# Patient Record
Sex: MALE | Race: AMERICAN INDIAN OR ALASKA NATIVE | NOT HISPANIC OR LATINO | URBAN - METROPOLITAN AREA
[De-identification: names, ages, dates, MRNs, and addresses within clinical notes are randomized per-mention and may not be internally consistent; named-entity substitution may affect disease eponyms.]

---

## 2018-08-14 ENCOUNTER — HOSPITAL ENCOUNTER (OUTPATIENT)
Dept: RADIOLOGY | Facility: MEDICAL CENTER | Age: 33
End: 2018-08-14

## 2018-08-14 ENCOUNTER — APPOINTMENT (OUTPATIENT)
Dept: RADIOLOGY | Facility: MEDICAL CENTER | Age: 33
DRG: 084 | End: 2018-08-14
Attending: EMERGENCY MEDICINE
Payer: COMMERCIAL

## 2018-08-14 ENCOUNTER — HOSPITAL ENCOUNTER (INPATIENT)
Facility: MEDICAL CENTER | Age: 33
LOS: 4 days | DRG: 084 | End: 2018-08-18
Attending: EMERGENCY MEDICINE | Admitting: SURGERY
Payer: COMMERCIAL

## 2018-08-14 ENCOUNTER — RESOLUTE PROFESSIONAL BILLING HOSPITAL PROF FEE (OUTPATIENT)
Dept: HOSPITALIST | Facility: MEDICAL CENTER | Age: 33
End: 2018-08-14
Payer: COMMERCIAL

## 2018-08-14 ENCOUNTER — APPOINTMENT (OUTPATIENT)
Dept: RADIOLOGY | Facility: MEDICAL CENTER | Age: 33
DRG: 084 | End: 2018-08-14
Attending: NURSE PRACTITIONER
Payer: COMMERCIAL

## 2018-08-14 DIAGNOSIS — S02.11HA: ICD-10-CM

## 2018-08-14 DIAGNOSIS — S52.102A CLOSED FRACTURE OF PROXIMAL END OF LEFT RADIUS, UNSPECIFIED FRACTURE MORPHOLOGY, INITIAL ENCOUNTER: ICD-10-CM

## 2018-08-14 DIAGNOSIS — I61.9 INTRAPARENCHYMAL HEMORRHAGE OF BRAIN (HCC): ICD-10-CM

## 2018-08-14 DIAGNOSIS — S06.6X9A SUBARACHNOID HEMORRHAGE FOLLOWING INJURY, WITH LOSS OF CONSCIOUSNESS, INITIAL ENCOUNTER (HCC): ICD-10-CM

## 2018-08-14 DIAGNOSIS — T14.90XA TRAUMA: ICD-10-CM

## 2018-08-14 PROBLEM — F10.929 ALCOHOL INTOXICATION (HCC): Status: ACTIVE | Noted: 2018-08-14

## 2018-08-14 PROBLEM — S02.119A FRACTURE OF OCCIPITAL BONE (HCC): Status: ACTIVE | Noted: 2018-08-14

## 2018-08-14 PROBLEM — Z53.09 CONTRAINDICATION TO DEEP VEIN THROMBOSIS (DVT) PROPHYLAXIS: Status: ACTIVE | Noted: 2018-08-14

## 2018-08-14 LAB
ABO GROUP BLD: NORMAL
ABO GROUP BLD: NORMAL
ALBUMIN SERPL BCP-MCNC: 4.6 G/DL (ref 3.2–4.9)
ALBUMIN/GLOB SERPL: 1.6 G/DL
ALP SERPL-CCNC: 63 U/L (ref 30–99)
ALT SERPL-CCNC: 18 U/L (ref 2–50)
ANION GAP SERPL CALC-SCNC: 13 MMOL/L (ref 0–11.9)
APTT PPP: 25.2 SEC (ref 24.7–36)
AST SERPL-CCNC: 21 U/L (ref 12–45)
BILIRUB SERPL-MCNC: 0.5 MG/DL (ref 0.1–1.5)
BLD GP AB SCN SERPL QL: NORMAL
BUN SERPL-MCNC: 7 MG/DL (ref 8–22)
CALCIUM SERPL-MCNC: 8.9 MG/DL (ref 8.5–10.5)
CHLORIDE SERPL-SCNC: 106 MMOL/L (ref 96–112)
CO2 SERPL-SCNC: 23 MMOL/L (ref 20–33)
CREAT SERPL-MCNC: 0.78 MG/DL (ref 0.5–1.4)
ERYTHROCYTE [DISTWIDTH] IN BLOOD BY AUTOMATED COUNT: 47.3 FL (ref 35.9–50)
ETHANOL BLD-MCNC: 0.2 G/DL
GLOBULIN SER CALC-MCNC: 2.9 G/DL (ref 1.9–3.5)
GLUCOSE SERPL-MCNC: 113 MG/DL (ref 65–99)
HCT VFR BLD AUTO: 49.2 % (ref 42–52)
HGB BLD-MCNC: 16.7 G/DL (ref 14–18)
INR PPP: 1.05 (ref 0.87–1.13)
MCH RBC QN AUTO: 30.4 PG (ref 27–33)
MCHC RBC AUTO-ENTMCNC: 33.9 G/DL (ref 33.7–35.3)
MCV RBC AUTO: 89.5 FL (ref 81.4–97.8)
PLATELET # BLD AUTO: 192 K/UL (ref 164–446)
PMV BLD AUTO: 9.2 FL (ref 9–12.9)
POTASSIUM SERPL-SCNC: 4.2 MMOL/L (ref 3.6–5.5)
PROT SERPL-MCNC: 7.5 G/DL (ref 6–8.2)
PROTHROMBIN TIME: 13.4 SEC (ref 12–14.6)
RBC # BLD AUTO: 5.5 M/UL (ref 4.7–6.1)
RH BLD: NORMAL
RH BLD: NORMAL
SODIUM SERPL-SCNC: 142 MMOL/L (ref 135–145)
WBC # BLD AUTO: 12.1 K/UL (ref 4.8–10.8)

## 2018-08-14 PROCEDURE — 85730 THROMBOPLASTIN TIME PARTIAL: CPT

## 2018-08-14 PROCEDURE — 770022 HCHG ROOM/CARE - ICU (200)

## 2018-08-14 PROCEDURE — 85610 PROTHROMBIN TIME: CPT

## 2018-08-14 PROCEDURE — A9270 NON-COVERED ITEM OR SERVICE: HCPCS | Performed by: SURGERY

## 2018-08-14 PROCEDURE — 85027 COMPLETE CBC AUTOMATED: CPT

## 2018-08-14 PROCEDURE — 80307 DRUG TEST PRSMV CHEM ANLYZR: CPT

## 2018-08-14 PROCEDURE — 99291 CRITICAL CARE FIRST HOUR: CPT | Performed by: SURGERY

## 2018-08-14 PROCEDURE — 700102 HCHG RX REV CODE 250 W/ 637 OVERRIDE(OP): Performed by: NURSE PRACTITIONER

## 2018-08-14 PROCEDURE — 86901 BLOOD TYPING SEROLOGIC RH(D): CPT

## 2018-08-14 PROCEDURE — 73090 X-RAY EXAM OF FOREARM: CPT | Mod: LT

## 2018-08-14 PROCEDURE — 99291 CRITICAL CARE FIRST HOUR: CPT

## 2018-08-14 PROCEDURE — 80053 COMPREHEN METABOLIC PANEL: CPT

## 2018-08-14 PROCEDURE — 86850 RBC ANTIBODY SCREEN: CPT

## 2018-08-14 PROCEDURE — 700111 HCHG RX REV CODE 636 W/ 250 OVERRIDE (IP): Performed by: NURSE PRACTITIONER

## 2018-08-14 PROCEDURE — 86900 BLOOD TYPING SEROLOGIC ABO: CPT

## 2018-08-14 PROCEDURE — A9270 NON-COVERED ITEM OR SERVICE: HCPCS | Performed by: NURSE PRACTITIONER

## 2018-08-14 PROCEDURE — G0390 TRAUMA RESPONS W/HOSP CRITI: HCPCS

## 2018-08-14 PROCEDURE — 700105 HCHG RX REV CODE 258: Performed by: NURSE PRACTITIONER

## 2018-08-14 PROCEDURE — 70450 CT HEAD/BRAIN W/O DYE: CPT

## 2018-08-14 PROCEDURE — 700102 HCHG RX REV CODE 250 W/ 637 OVERRIDE(OP): Performed by: SURGERY

## 2018-08-14 RX ORDER — POLYETHYLENE GLYCOL 3350 17 G/17G
1 POWDER, FOR SOLUTION ORAL 2 TIMES DAILY
Status: DISCONTINUED | OUTPATIENT
Start: 2018-08-14 | End: 2018-08-18 | Stop reason: HOSPADM

## 2018-08-14 RX ORDER — ACETAMINOPHEN 325 MG/1
650 TABLET ORAL EVERY 4 HOURS PRN
Status: DISCONTINUED | OUTPATIENT
Start: 2018-08-14 | End: 2018-08-18 | Stop reason: HOSPADM

## 2018-08-14 RX ORDER — OXYCODONE HYDROCHLORIDE 5 MG/1
5 TABLET ORAL
Status: DISCONTINUED | OUTPATIENT
Start: 2018-08-14 | End: 2018-08-18 | Stop reason: HOSPADM

## 2018-08-14 RX ORDER — PHENOBARBITAL SODIUM 130 MG/ML
260 INJECTION INTRAMUSCULAR; INTRAVENOUS
Status: DISCONTINUED | OUTPATIENT
Start: 2018-08-14 | End: 2018-08-15

## 2018-08-14 RX ORDER — ACETAMINOPHEN 650 MG/1
650 SUPPOSITORY RECTAL EVERY 4 HOURS PRN
Status: DISCONTINUED | OUTPATIENT
Start: 2018-08-14 | End: 2018-08-18 | Stop reason: HOSPADM

## 2018-08-14 RX ORDER — FAMOTIDINE 20 MG/1
20 TABLET, FILM COATED ORAL 2 TIMES DAILY
Status: DISCONTINUED | OUTPATIENT
Start: 2018-08-14 | End: 2018-08-16

## 2018-08-14 RX ORDER — LEVETIRACETAM 500 MG/1
500 TABLET ORAL 2 TIMES DAILY
Status: DISCONTINUED | OUTPATIENT
Start: 2018-08-14 | End: 2018-08-18 | Stop reason: HOSPADM

## 2018-08-14 RX ORDER — ENEMA 19; 7 G/133ML; G/133ML
1 ENEMA RECTAL
Status: DISCONTINUED | OUTPATIENT
Start: 2018-08-14 | End: 2018-08-18 | Stop reason: HOSPADM

## 2018-08-14 RX ORDER — AMOXICILLIN 250 MG
1 CAPSULE ORAL
Status: DISCONTINUED | OUTPATIENT
Start: 2018-08-14 | End: 2018-08-18 | Stop reason: HOSPADM

## 2018-08-14 RX ORDER — AMOXICILLIN 250 MG
1 CAPSULE ORAL NIGHTLY
Status: DISCONTINUED | OUTPATIENT
Start: 2018-08-14 | End: 2018-08-18 | Stop reason: HOSPADM

## 2018-08-14 RX ORDER — DOCUSATE SODIUM 100 MG/1
100 CAPSULE, LIQUID FILLED ORAL 2 TIMES DAILY
Status: DISCONTINUED | OUTPATIENT
Start: 2018-08-14 | End: 2018-08-18 | Stop reason: HOSPADM

## 2018-08-14 RX ORDER — OXYCODONE HYDROCHLORIDE 10 MG/1
10 TABLET ORAL
Status: DISCONTINUED | OUTPATIENT
Start: 2018-08-14 | End: 2018-08-18 | Stop reason: HOSPADM

## 2018-08-14 RX ORDER — ONDANSETRON 2 MG/ML
4 INJECTION INTRAMUSCULAR; INTRAVENOUS EVERY 4 HOURS PRN
Status: DISCONTINUED | OUTPATIENT
Start: 2018-08-14 | End: 2018-08-18 | Stop reason: HOSPADM

## 2018-08-14 RX ORDER — BISACODYL 10 MG
10 SUPPOSITORY, RECTAL RECTAL
Status: DISCONTINUED | OUTPATIENT
Start: 2018-08-14 | End: 2018-08-18 | Stop reason: HOSPADM

## 2018-08-14 RX ORDER — MORPHINE SULFATE 2 MG/ML
2-4 INJECTION, SOLUTION INTRAMUSCULAR; INTRAVENOUS
Status: DISCONTINUED | OUTPATIENT
Start: 2018-08-14 | End: 2018-08-18 | Stop reason: HOSPADM

## 2018-08-14 RX ORDER — SODIUM CHLORIDE 9 MG/ML
INJECTION, SOLUTION INTRAVENOUS CONTINUOUS
Status: DISCONTINUED | OUTPATIENT
Start: 2018-08-14 | End: 2018-08-18 | Stop reason: HOSPADM

## 2018-08-14 RX ORDER — PHENOBARBITAL SODIUM 130 MG/ML
130 INJECTION INTRAMUSCULAR; INTRAVENOUS
Status: DISCONTINUED | OUTPATIENT
Start: 2018-08-14 | End: 2018-08-15

## 2018-08-14 RX ADMIN — SODIUM CHLORIDE 500 MG: 9 INJECTION, SOLUTION INTRAVENOUS at 07:28

## 2018-08-14 RX ADMIN — SODIUM CHLORIDE: 9 INJECTION, SOLUTION INTRAVENOUS at 20:22

## 2018-08-14 RX ADMIN — OXYCODONE HYDROCHLORIDE 5 MG: 5 TABLET ORAL at 06:38

## 2018-08-14 RX ADMIN — SODIUM CHLORIDE: 9 INJECTION, SOLUTION INTRAVENOUS at 06:39

## 2018-08-14 RX ADMIN — FAMOTIDINE 20 MG: 20 TABLET ORAL at 17:11

## 2018-08-14 RX ADMIN — LEVETIRACETAM 500 MG: 500 TABLET ORAL at 17:11

## 2018-08-14 RX ADMIN — FAMOTIDINE 20 MG: 20 TABLET ORAL at 06:38

## 2018-08-14 ASSESSMENT — COGNITIVE AND FUNCTIONAL STATUS - GENERAL
SUGGESTED CMS G CODE MODIFIER DAILY ACTIVITY: CK
STANDING UP FROM CHAIR USING ARMS: A LITTLE
CLIMB 3 TO 5 STEPS WITH RAILING: A LOT
DAILY ACTIVITIY SCORE: 18
MOBILITY SCORE: 18
WALKING IN HOSPITAL ROOM: A LOT
DRESSING REGULAR LOWER BODY CLOTHING: A LITTLE
SUGGESTED CMS G CODE MODIFIER MOBILITY: CK
MOVING TO AND FROM BED TO CHAIR: A LITTLE
PERSONAL GROOMING: A LITTLE
TOILETING: A LITTLE
DRESSING REGULAR UPPER BODY CLOTHING: A LITTLE
EATING MEALS: A LITTLE
HELP NEEDED FOR BATHING: A LITTLE

## 2018-08-14 ASSESSMENT — LIFESTYLE VARIABLES
AVERAGE NUMBER OF DAYS PER WEEK YOU HAVE A DRINK CONTAINING ALCOHOL: 7
TOTAL SCORE: 4
TOTAL SCORE: 4
EVER HAD A DRINK FIRST THING IN THE MORNING TO STEADY YOUR NERVES TO GET RID OF A HANGOVER: YES
EVER_SMOKED: NEVER
DOES PATIENT WANT TO STOP DRINKING: CANNOT ASSESS
HAVE PEOPLE ANNOYED YOU BY CRITICIZING YOUR DRINKING: YES
HAVE YOU EVER FELT YOU SHOULD CUT DOWN ON YOUR DRINKING: YES
EVER FELT BAD OR GUILTY ABOUT YOUR DRINKING: YES
ON A TYPICAL DAY WHEN YOU DRINK ALCOHOL HOW MANY DRINKS DO YOU HAVE: 12
EVER_SMOKED: YES
ALCOHOL_USE: YES
TOTAL SCORE: 4
CONSUMPTION TOTAL: POSITIVE
HOW MANY TIMES IN THE PAST YEAR HAVE YOU HAD 5 OR MORE DRINKS IN A DAY: 100

## 2018-08-14 ASSESSMENT — COPD QUESTIONNAIRES
COPD SCREENING SCORE: 2
DURING THE PAST 4 WEEKS HOW MUCH DID YOU FEEL SHORT OF BREATH: NONE/LITTLE OF THE TIME
HAVE YOU SMOKED AT LEAST 100 CIGARETTES IN YOUR ENTIRE LIFE: YES
DO YOU EVER COUGH UP ANY MUCUS OR PHLEGM?: NO/ONLY WITH OCCASIONAL COLDS OR INFECTIONS
DURING THE PAST 4 WEEKS HOW MUCH DID YOU FEEL SHORT OF BREATH: NONE/LITTLE OF THE TIME
IN THE PAST 12 MONTHS DO YOU DO LESS THAN YOU USED TO BECAUSE OF YOUR BREATHING PROBLEMS: DISAGREE/UNSURE
HAVE YOU SMOKED AT LEAST 100 CIGARETTES IN YOUR ENTIRE LIFE: NO/DON'T KNOW
DO YOU EVER COUGH UP ANY MUCUS OR PHLEGM?: NO/ONLY WITH OCCASIONAL COLDS OR INFECTIONS

## 2018-08-14 ASSESSMENT — PATIENT HEALTH QUESTIONNAIRE - PHQ9
1. LITTLE INTEREST OR PLEASURE IN DOING THINGS: NOT AT ALL
SUM OF ALL RESPONSES TO PHQ9 QUESTIONS 1 AND 2: 0
2. FEELING DOWN, DEPRESSED, IRRITABLE, OR HOPELESS: NOT AT ALL

## 2018-08-14 ASSESSMENT — PAIN SCALES - GENERAL
PAINLEVEL_OUTOF10: 4
PAINLEVEL_OUTOF10: 5
PAINLEVEL_OUTOF10: 5
PAINLEVEL_OUTOF10: 4
PAINLEVEL_OUTOF10: 5
PAINLEVEL_OUTOF10: 3
PAINLEVEL_OUTOF10: 4
PAINLEVEL_OUTOF10: 5
PAINLEVEL_OUTOF10: 3
PAINLEVEL_OUTOF10: 4

## 2018-08-14 NOTE — PROGRESS NOTES
"  Trauma/Surgical Progress Note    Author: Kale Chavez Date & Time created: 8/14/2018   3:38 PM     Interval Events:  New admit. 33 year old male with heavy alcohol use fell off 3 foot wall. Transferred to [facility] [place] with intracranial hemorrhage, skull fracture.   Seen by consultants    Hemodynamics:  Blood pressure 130/89, pulse (!) 101, temperature 36.7 °C (98 °F), resp. rate 18, height 1.803 m (5' 11\"), weight 90.5 kg (199 lb 8.3 oz), SpO2 97 %.     Respiratory:    Respiration: 18, Pulse Oximetry: 97 %           Fluids:    Intake/Output Summary (Last 24 hours) at 08/14/18 1538  Last data filed at 08/14/18 1400   Gross per 24 hour   Intake             1364 ml   Output              500 ml   Net              864 ml     Admit Weight: 77 kg (169 lb 12.1 oz)  Current Weight: 90.5 kg (199 lb 8.3 oz)    Physical Exam   Constitutional: He is oriented to person, place, and time. He appears well-developed and well-nourished. No distress.   HENT:   Head: Normocephalic and atraumatic.   Eyes: Pupils are equal, round, and reactive to light. EOM are normal.   Neck:   C collar in place   Cardiovascular: Normal rate and regular rhythm.    Pulmonary/Chest: Breath sounds normal. No respiratory distress.   Abdominal: Soft. He exhibits no distension.   Genitourinary: Penis normal.   Musculoskeletal: Normal range of motion. He exhibits no edema.   Left wrist wrapped   Neurological: He is alert and oriented to person, place, and time.   Skin: Skin is warm and dry.   Psychiatric: He has a normal mood and affect. His behavior is normal.   Nursing note and vitals reviewed.      Medical Decision Making/Problem List:    Active Hospital Problems    Diagnosis   • Intraparenchymal hemorrhage of brain (HCC) [I61.9]     Priority: High     Noted on CT from [facility]  Repeat Ct of head 6 hours from original   Keppra prophylactic X 7 days  ICU observation, Q 2 hour neuro checks  Cognitive evaluation  Non-operative management.  Kaushik Mckeon III " "MD. Neurosurgery.     • Fracture of occipital bone (HCC) [S02.119A]     Priority: High     Occipital skull fracture noted on CT films from [facility]  Non-operative management.  Kaushik Mckeon III, MD. Neurosurgery.     • Alcohol intoxication (HCC) [F10.929]     Priority: Medium     Admits to daily and heavy use  BA .278  Alcohol withdrawal surveillance.  Brief intervention pending.     • Trauma [T14.90XA]     Priority: Low     Trauma Green transfer, fell off 3 foot wall and hit head, unknown LOC, amnestic to event     • Closed fracture of left proximal radius [S52.102A]     Priority: Low     From previous fall \"few days\" prior to presenting for tonight's fall.  Sugar tong splint applied by Hugo [facility]te[facility]  Imaging at [facility] showed no fracture     • Contraindication to deep vein thrombosis (DVT) prophylaxis [Z53.09]     Priority: Low     Systemic anticoagulation contraindicated secondary to elevated bleeding risk.  Consider surveillance venous duplex scanning if unable to start Lovenox in 48 hours.       Core Measures & Quality Metrics:  Labs reviewed, Medications reviewed and Radiology images reviewed  Livingston catheter: No Livingston      DVT Prophylaxis: Contraindicated - High bleeding risk  DVT prophylaxis - mechanical: SCDs  Ulcer prophylaxis: Yes      Plan:  Neuro checks  Cog eval  C collar clearance per Dr. Mckeon in setting of occipital skull fracture  Alcohol withdrawal protocol - phenobarb    RAJWINDER Score  Discussed patient condition with RN, RT and Pharmacy.  The patient is/remains critically ill with traumatic brain injury, alcohol withdrawal.    I provided the following critical care services: management of above, high risk medication management    Critical care time spent exclusive of procedures: 40 minutes.    Kale Chavez MD  [phone]      "

## 2018-08-14 NOTE — PROGRESS NOTES
Spoke with Dr. Mckeon, and updated him on head CT results. Per Dr. Mckeon, would like patient to stay in ICU with Q2 hour neuro checks overnight. Will also re-evaluate c-collar in the morning. Patient can have diet order, and PT/OT in the morning

## 2018-08-14 NOTE — ED NOTES
Pt BIB from EMS from [facility].  Pt had GLF from 3 ft wall.  Positive CT for head bleed in [place].  Pt arrived in c-collar.  Posterior head contusion with head tenderness.  Pt previous injury fx left arm with sling.  Pt GCS 15 AxOx4.  SVETLANA .274 at previous facility

## 2018-08-14 NOTE — PROGRESS NOTES
Pt arrived to S104. VSS, 2LNC, Temp 99.8F and weight is 90.5kg    Two RN head to toe skin assessment completed.  No areas of concern identified.  Preventative measures in place.

## 2018-08-14 NOTE — CONSULTS
DATE OF SERVICE:  08/14/2018    EMERGENCY ROOM CONSULTATION    CHIEF COMPLAINT:  Headache, intracranial hemorrhage.    HISTORY OF PRESENT ILLNESS:  A 33-year-old right-handed man from [place], who   has several alcoholic falls recently including a left arm fracture about a   week ago, when walking around in pain, fell backwards, drunk today.  He has a   nondisplaced left occipital bone fracture and a contrecoup, right frontal   temporal contusion/subarachnoid hemorrhage.  There is mild edema in the brain   diffusely.  He has been awake and alert, although intoxicated.    PAST MEDICAL HISTORY:  As mentioned in the HPI.    PAST SURGICAL HISTORY:  Negative.    SOCIAL HISTORY:  He drinks, does not smoke.  There is no family at his   bedside.    PHYSICAL EXAMINATION:  GENERAL:  This man opens his eyes to voice and answers questions   appropriately, slightly intoxicated.  He is oriented x3.  NEUROLOGIC:  Pupils are symmetric.  Extraocular motions intact.  Face is full.    His tongue is midline.  He has no pronator drift.  He moves his arms and   legs symmetrically and good sensation in the face, arms, and legs except that   which is casted in the left upper extremity.    ASSESSMENT AND PLAN:  The patient has intracranial hemorrhagic and contusion   from alcoholic fall.  I recommend repeat CAT scan in 6 hours.  He should be   loaded on Keppra 1000 mg one time and then 500 b.i.d. x7 days for his seizure   risk.  He is okay to eat.  He needs q. 2-hour neuro checks.  He will be   admitted to the trauma service.  I do not think he will need surgery for this.    Thanks for allowing us to participate in his care.       ____________________________________     MD ELSI Barrios IIIP / NTS    DD:  08/14/2018 06:14:25  DT:  08/14/2018 06:34:40    D#:  [id number]  Job#:  [id number]

## 2018-08-14 NOTE — ED PROVIDER NOTES
"ED Provider Note    CHIEF COMPLAINT  No chief complaint on file.      HPI  Event Eighty-Seven is a 33 y.o. male who presents to the emergency department by ambulance from fixed wing transferred from outside facility for higher level care, trauma, neurosurgical evaluation.  Patient reportedly intoxicated, fell backwards off a three-foot wall striking the back of his head.  Unknown loss of consciousness.  Initial imaging demonstrates \"intraparenchymal hemorrhage\" with reports of \"proximal radius fracture,\" as well.  Patient has no recall of fall tonight and only complains of mild discomfort in the posterior portion of his head.  Left upper extremity is splinted although patient states initial injury was last week, however first evaluated this evening.      Patient admits to binge drinking lately although does not normally drink alcohol daily.  Occasional tobacco use.  Denies any illicit drugs or marijuana.    HPI is limited due to patient altered mental state, intoxication, head injury.    REVIEW OF SYSTEMS  See HPI for further details.  limited due to patient presentation.    PAST MEDICAL HISTORY   Patient denies    SOCIAL HISTORY  Social History     Social History Main Topics   • Smoking status: Current Some Day Smoker   • Smokeless tobacco: Never Used   • Alcohol use Yes      Comment: occ, daily x 1 week   • Drug use: No   • Sexual activity: Not on file       SURGICAL HISTORY  patient denies any surgical history    CURRENT MEDICATIONS  Home Medications     Reviewed by Ken Espinoza R.N. (Registered Nurse) on 08/14/18 at 0526  Med List Status: Complete   Medication Last Dose Status        Patient Jose Antonio Taking any Medications                       ALLERGIES  No Known Allergies      PHYSICAL EXAM  VITAL SIGNS: /89   Pulse (!) 106   Temp 36.9 °C (98.4 °F)   Resp 20   Ht 1.803 m (5' 11\")   Wt 77 kg (169 lb 12.1 oz)   SpO2 94%   BMI 23.68 kg/m²   Pulse ox interpretation: I interpret this pulse ox as " normal.  Constitutional: Somnolent but arousable and conversive.  In no apparent distress.  HENT: Normocephalic, no significant cephalohematoma. Bilateral external ears normal,No hemotympanum. Nose normal. No oral trauma.    Eyes: Pupils are equal and reactive, 4-2 bilaterally.  Conjunctiva normal.  EOMI without pain or resistance.  No subconjunctival hemorrhage, chemosis or hyphema.  Neck: No tenderness to palpation midline, no step-offs.  Cervical collar remains in place awaiting imaging results, as patient is also intoxicated.  Cardiovascular: Regular rate and rhythm, no murmurs. Distal pulses intact.    Thorax & Lungs: Normal breath sounds, No respiratory distress, No wheezing/rales/robchi. No chest tenderness or crepitus.    Abdomen: Soft, non-distended, non-tender, no palpable or pulsatile masses. No peritoneal signs. No abrasions/ecchymosis.  Skin: Warm, Dry.  No abrasions or ecchymosis.  Back: No midline thoracic or lumbar tenderness, no step-offs.  No abrasion or hematoma.  Musculoskeletal: Left upper extremity splinted with 90° at elbow.  Fingers exposed, less than 2 second capillary refill, warm to touch, sensation intact to light touch.  Thumbs up, okay, opposition intact.  Otherwise good range of motion in all major joints. No tenderness to palpation or major deformities noted.   Neurologic: Somnolent but arousable and conversive, alert and oriented ×3, poor recall of the events of the evening.  GCS 15.  Moves 4 extremities spontaneously.  5 out of 5 strength in 4 extremities, left upper extremity somewhat difficult to assess due to splinting.  Sensation intact to light touch.  Psychiatric: Flat affect.  Somnolent but arousable and conversive.      DIAGNOSTIC STUDIES / PROCEDURES    LABS  Results for orders placed or performed during the hospital encounter of 08/14/18   CBC WITHOUT DIFFERENTIAL   Result Value Ref Range    WBC 12.1 (H) 4.8 - 10.8 K/uL    RBC 5.50 4.70 - 6.10 M/uL    Hemoglobin 16.7 14.0  - 18.0 g/dL    Hematocrit 49.2 42.0 - 52.0 %    MCV 89.5 81.4 - 97.8 fL    MCH 30.4 27.0 - 33.0 pg    MCHC 33.9 33.7 - 35.3 g/dL    RDW 47.3 35.9 - 50.0 fL    Platelet Count 192 164 - 446 K/uL    MPV 9.2 9.0 - 12.9 fL       RADIOLOGY  DX-FOREARM LEFT   Final Result      No acute fracture is identified.      OUTSIDE IMAGES-DX UPPER EXTREMITY, LEFT   Final Result      OUTSIDE IMAGES-DX UPPER EXTREMITY, LEFT   Final Result      OUTSIDE IMAGES-DX UPPER EXTREMITY, LEFT   Final Result      OUTSIDE IMAGES-DX CHEST   Final Result      OUTSIDE IMAGES-CT CERVICAL SPINE   Final Result      OUTSIDE IMAGES-CT HEAD   Final Result      CT-HEAD W/O    (Results Pending)   CT-CSPINE WITHOUT PLUS RECONS    (Results Pending)     [facility]  CT head: Right parietotemporal subarachnoid hemorrhage.  Additionally notes right parietotemporal subarachnoid hemorrhage involving sylvian fissure.  No brain herniation.  No bony fracture identified.  Ventricles and sulci are normal for age.  The third and fourth ventricles are patent.  Basal cisterns are unremarkable.  Bony structures are unremarkable.  Mastoid air cells within normal limits.    CT cervical spine: No acute fracture or subluxation.  However body of the report notes hairline nondepressed fracture of the left occipital bone is noted and better seen than on head CT.  There is straightening of lordosis of the cervical spine.  This may be due to muscle spasm, positioning, or soft tissue injury.  There is no evidence for acute bony fracture or subluxation.  Prevertebral soft tissues within normal limits.      X-ray left wrist: No acute fracture.      X-ray left forearm: No fracture.      Chest x-ray: No acute cardiopulmonary disease.  Chest apices suboptimally imaged.      COURSE & MEDICAL DECISION MAKING  Patient seen and evaluated in trauma room, per trauma Green protocol upon arrival.    33-year-old male with history of alcohol abuse, admits to  recent binge drinking, has no recall of her reported fall from a 3 foot wall earlier this evening.  Unknown loss of consciousness.  Imaging at outside facility noted a right parietotemporal subarachnoid hemorrhage and left occipital bone fracture, nondisplaced, nondepressed.  Patient is neurologically intact, GCS 15, remains only confused to the events of the fall.  Hemodynamically stable, blood pressure well controlled.  No physical exam evidence for other trauma.  Left upper extremity was splinted prior to arrival, CMS is intact distally.  However, review of outside images and repeat forearm x-rays at this facility does not demonstrate a fracture.  Unknown reason for splinting.  Injury was reportedly 1 week old, however first assessed this evening.  Splint remains until patient not intoxicated and can be reexamined by trauma surgery or orthopedics.    0534 -Dr. Mckeon, neurosurgery, is aware of the patient and will review images.  Agrees with ICU admission to repeat imaging at 6 hour ruma, 3 hours for now.    Trauma surgery NP at bedside, has placed orders for admission.    FINAL IMPRESSION  (S06.6X9A) Subarachnoid hemorrhage following injury, with loss of consciousness, initial encounter (HCC)  (S02.11HA) Other closed fracture of left side of occipital bone, initial encounter (HCC)      Electronically signed by: Yelena Martinez, 8/14/2018 5:34 AM      This dictation was created using voice recognition software. The accuracy of the dictation is limited to the abilities of the software. I expect there may be some errors of grammar and possibly content. The nursing notes were reviewed and certain aspects of this information were incorporated into this note.

## 2018-08-14 NOTE — CARE PLAN
Problem: Safety  Goal: Will remain free from injury  Safety precautions in place    Problem: Pain Management  Goal: Pain level will decrease to patient's comfort goal  Medicated as needed for pain per MAR

## 2018-08-14 NOTE — H&P
"TRAUMA HISTORY AND PHYSICAL    CHIEF COMPLAINT: Intracranial hemorrhage.     HISTORY OF PRESENT ILLNESS: The patient is a 33 year-old man who sustained a fall from a three-foot wall.  He struck his posterior head and left arm.  He was initially evaluated at [facility] where imaging demonstrated a left wrist fracture and intraparenchymal hemorrhage. He was transported to [facility] in [place], [place] for a definitive trauma evaluation. The patient was triaged as a Trauma Green Transfer in accordance with established pre hospital protocols. An expeditious primary and secondary survey with required adjuncts was conducted. See Trauma Narrator for full details.    PAST MEDICAL HISTORY:  has no past medical history on file.     PAST SURGICAL HISTORY:  has no past surgical history on file.    ALLERGIES: No Known Allergies    CURRENT MEDICATIONS:    Home Medications     Reviewed by Ken Espinoza R.N. (Registered Nurse) on 08/14/18 at 0526  Med List Status: Complete   Medication Last Dose Status        Patient Jose Antonio Taking any Medications                     FAMILY HISTORY: family history is not on file.    SOCIAL HISTORY:      REVIEW OF SYSTEMS:  Is negative with the exception of the aforementioned details in the history of present illness, past medical history, and past surgical history in accordance with CMS guidelines.    PHYSICAL EXAMINATION:     CONSTITUTIONAL:     Vital Signs: Blood pressure 130/89, pulse (!) 106, temperature 36.9 °C (98.4 °F), resp. rate 20, height 1.803 m (5' 11\"), weight 77 kg (169 lb 12.1 oz), SpO2 94 %.   General Appearance: appears stated age, is in no apparent distress.  HEENT:     No significant external craniofacial trauma. The pupils are equal, round, and react to light. The extraocular muscles are intact. The ear canals and tympanic membranes are normal. The nares and oropharynx are clear. The midface and jaw are stable. No malocclusion is " evident.  NECK:    The cervical spine is immobilized with a collar. The trachea is midline. There is no jugulovenous distention or cervical crepitance.   RESPIRATORY:   Inspection: Unlabored respirations, no intercostal retractions, paradoxical motion, or accessory muscle use.   Palpation:  The chest is nontender. The clavicles are nondeformed.   Auscultation: clear to auscultation.  CARDIOVASCULAR:   Auscultation: regular rate and rhythm.   Peripheral Pulses: Normal.   ABDOMEN:   Abdomen is soft, nontender, without organomegaly or masses.  MUSCULOSKELETAL:   The pelvis is stable. Splinted left upper extremity. No other significant angulation or deformity  BACK:   Inspection of back is normal, no tenderness noted.  SKIN:    No cyanosis or pallor.  NEUROLOGIC:    GCS 15. no focal deficits noted, muscle tone normal, muscle strength normal, sensation is intact.  PSYCHIATRIC:   Does not appear depressed or anxious, oriented to time, place, person, intoxicated.    LABORATORY VALUES:   Recent Labs      08/14/18   0513   WBC  12.1*   RBC  5.50   HEMOGLOBIN  16.7   HEMATOCRIT  49.2   MCV  89.5   MCH  30.4   MCHC  33.9   RDW  47.3   PLATELETCT  192   MPV  9.2                    IMAGING:   DX-FOREARM LEFT   Final Result      No acute fracture is identified.      OUTSIDE IMAGES-DX UPPER EXTREMITY, LEFT   Final Result      OUTSIDE IMAGES-DX UPPER EXTREMITY, LEFT   Final Result      OUTSIDE IMAGES-DX UPPER EXTREMITY, LEFT   Final Result      OUTSIDE IMAGES-DX CHEST   Final Result      OUTSIDE IMAGES-CT CERVICAL SPINE   Final Result      OUTSIDE IMAGES-CT HEAD   Final Result      CT-HEAD W/O    (Results Pending)   CT-CSPINE WITHOUT PLUS RECONS    (Results Pending)       IMPRESSION AND PLAN:     Active Hospital Problems    Diagnosis   • Intraparenchymal hemorrhage of brain (HCC) [I61.9]     Priority: High     Noted [facility] from [facility]  Repeat Ct of head 6 hours from original   Keppra prophylactic X 7 days  Consult pending from  "ED  Definitive plan pending.  Kaushik Mckeon III, MD. Neurosurgery.     • Alcohol intoxication (HCC) [F10.929]     Priority: Medium     Admits to daily and heavy use  BA .278  Monitor for withdrawal symptoms     • Closed fracture of left proximal radius [S52.102A]     Priority: Medium     From previous fall \"few days\" prior to presenting for tonight's fall.  Sugar tong splint applied by [facility]     • Trauma [T14.90XA]     Priority: Low     Trauma Green transfer, fell off 3 foot wall and hit head, unknown LOC, amnestic to event     • Contraindication to deep vein thrombosis (DVT) prophylaxis [Z53.09]     Priority: Low     Systemic anticoagulation contraindicated secondary to elevated bleeding risk.  Consider surveillance venous duplex scanning if unable to start Lovenox in 48 hours.         DISPOSITION:  Trauma ICU.    CRITICAL CARE TIME: 32 minutes excluding procedures.  ____________________________________   Miguel Cabrera M.D.    DD: 8/14/2018  5:21 AM    "

## 2018-08-15 ENCOUNTER — APPOINTMENT (OUTPATIENT)
Dept: RADIOLOGY | Facility: MEDICAL CENTER | Age: 33
DRG: 084 | End: 2018-08-15
Attending: NURSE PRACTITIONER
Payer: COMMERCIAL

## 2018-08-15 LAB
ALBUMIN SERPL BCP-MCNC: 3.8 G/DL (ref 3.2–4.9)
ALBUMIN/GLOB SERPL: 1.4 G/DL
ALP SERPL-CCNC: 54 U/L (ref 30–99)
ALT SERPL-CCNC: 13 U/L (ref 2–50)
ANION GAP SERPL CALC-SCNC: 9 MMOL/L (ref 0–11.9)
AST SERPL-CCNC: 15 U/L (ref 12–45)
BASOPHILS # BLD AUTO: 0.5 % (ref 0–1.8)
BASOPHILS # BLD: 0.03 K/UL (ref 0–0.12)
BILIRUB SERPL-MCNC: 0.5 MG/DL (ref 0.1–1.5)
BUN SERPL-MCNC: 7 MG/DL (ref 8–22)
CALCIUM SERPL-MCNC: 8.6 MG/DL (ref 8.5–10.5)
CHLORIDE SERPL-SCNC: 102 MMOL/L (ref 96–112)
CO2 SERPL-SCNC: 23 MMOL/L (ref 20–33)
CREAT SERPL-MCNC: 0.75 MG/DL (ref 0.5–1.4)
EKG IMPRESSION: NORMAL
EOSINOPHIL # BLD AUTO: 0.02 K/UL (ref 0–0.51)
EOSINOPHIL NFR BLD: 0.3 % (ref 0–6.9)
ERYTHROCYTE [DISTWIDTH] IN BLOOD BY AUTOMATED COUNT: 46.2 FL (ref 35.9–50)
GLOBULIN SER CALC-MCNC: 2.7 G/DL (ref 1.9–3.5)
GLUCOSE SERPL-MCNC: 125 MG/DL (ref 65–99)
HCT VFR BLD AUTO: 40.7 % (ref 42–52)
HGB BLD-MCNC: 14 G/DL (ref 14–18)
IMM GRANULOCYTES # BLD AUTO: 0.02 K/UL (ref 0–0.11)
IMM GRANULOCYTES NFR BLD AUTO: 0.3 % (ref 0–0.9)
LYMPHOCYTES # BLD AUTO: 0.82 K/UL (ref 1–4.8)
LYMPHOCYTES NFR BLD: 12.6 % (ref 22–41)
MCH RBC QN AUTO: 30.7 PG (ref 27–33)
MCHC RBC AUTO-ENTMCNC: 34.4 G/DL (ref 33.7–35.3)
MCV RBC AUTO: 89.3 FL (ref 81.4–97.8)
MONOCYTES # BLD AUTO: 0.62 K/UL (ref 0–0.85)
MONOCYTES NFR BLD AUTO: 9.5 % (ref 0–13.4)
NEUTROPHILS # BLD AUTO: 5 K/UL (ref 1.82–7.42)
NEUTROPHILS NFR BLD: 76.8 % (ref 44–72)
NRBC # BLD AUTO: 0 K/UL
NRBC BLD-RTO: 0 /100 WBC
PLATELET # BLD AUTO: 156 K/UL (ref 164–446)
PMV BLD AUTO: 9.7 FL (ref 9–12.9)
POTASSIUM SERPL-SCNC: 3.6 MMOL/L (ref 3.6–5.5)
PROT SERPL-MCNC: 6.5 G/DL (ref 6–8.2)
RBC # BLD AUTO: 4.56 M/UL (ref 4.7–6.1)
SODIUM SERPL-SCNC: 134 MMOL/L (ref 135–145)
WBC # BLD AUTO: 6.5 K/UL (ref 4.8–10.8)

## 2018-08-15 PROCEDURE — 80053 COMPREHEN METABOLIC PANEL: CPT

## 2018-08-15 PROCEDURE — A9270 NON-COVERED ITEM OR SERVICE: HCPCS | Performed by: NURSE PRACTITIONER

## 2018-08-15 PROCEDURE — G8987 SELF CARE CURRENT STATUS: HCPCS | Mod: CJ

## 2018-08-15 PROCEDURE — 93010 ELECTROCARDIOGRAM REPORT: CPT | Performed by: INTERNAL MEDICINE

## 2018-08-15 PROCEDURE — 99233 SBSQ HOSP IP/OBS HIGH 50: CPT | Performed by: SURGERY

## 2018-08-15 PROCEDURE — 700102 HCHG RX REV CODE 250 W/ 637 OVERRIDE(OP): Performed by: NURSE PRACTITIONER

## 2018-08-15 PROCEDURE — G8978 MOBILITY CURRENT STATUS: HCPCS | Mod: CJ

## 2018-08-15 PROCEDURE — 97166 OT EVAL MOD COMPLEX 45 MIN: CPT

## 2018-08-15 PROCEDURE — 71045 X-RAY EXAM CHEST 1 VIEW: CPT

## 2018-08-15 PROCEDURE — G8979 MOBILITY GOAL STATUS: HCPCS | Mod: CH

## 2018-08-15 PROCEDURE — 700105 HCHG RX REV CODE 258: Performed by: NURSE PRACTITIONER

## 2018-08-15 PROCEDURE — 700111 HCHG RX REV CODE 636 W/ 250 OVERRIDE (IP): Performed by: NURSE PRACTITIONER

## 2018-08-15 PROCEDURE — 700102 HCHG RX REV CODE 250 W/ 637 OVERRIDE(OP): Performed by: SURGERY

## 2018-08-15 PROCEDURE — 93005 ELECTROCARDIOGRAM TRACING: CPT | Performed by: SURGERY

## 2018-08-15 PROCEDURE — 770022 HCHG ROOM/CARE - ICU (200)

## 2018-08-15 PROCEDURE — 97161 PT EVAL LOW COMPLEX 20 MIN: CPT

## 2018-08-15 PROCEDURE — G8988 SELF CARE GOAL STATUS: HCPCS | Mod: CI

## 2018-08-15 PROCEDURE — 85025 COMPLETE CBC W/AUTO DIFF WBC: CPT

## 2018-08-15 PROCEDURE — A9270 NON-COVERED ITEM OR SERVICE: HCPCS | Performed by: SURGERY

## 2018-08-15 RX ORDER — SODIUM CHLORIDE 1 G/1
2 TABLET ORAL
Status: DISCONTINUED | OUTPATIENT
Start: 2018-08-15 | End: 2018-08-18 | Stop reason: HOSPADM

## 2018-08-15 RX ADMIN — SODIUM CHLORIDE TAB 1 GM 2 G: 1 TAB at 17:47

## 2018-08-15 RX ADMIN — SODIUM CHLORIDE: 9 INJECTION, SOLUTION INTRAVENOUS at 10:00

## 2018-08-15 RX ADMIN — FAMOTIDINE 20 MG: 20 TABLET ORAL at 17:47

## 2018-08-15 RX ADMIN — LEVETIRACETAM 500 MG: 500 TABLET ORAL at 05:04

## 2018-08-15 RX ADMIN — SODIUM CHLORIDE TAB 1 GM 2 G: 1 TAB at 12:05

## 2018-08-15 RX ADMIN — OXYCODONE HYDROCHLORIDE 5 MG: 5 TABLET ORAL at 14:37

## 2018-08-15 RX ADMIN — ONDANSETRON 4 MG: 2 INJECTION INTRAMUSCULAR; INTRAVENOUS at 14:10

## 2018-08-15 RX ADMIN — LEVETIRACETAM 500 MG: 500 TABLET ORAL at 17:47

## 2018-08-15 RX ADMIN — FAMOTIDINE 20 MG: 20 TABLET ORAL at 05:04

## 2018-08-15 ASSESSMENT — PAIN SCALES - GENERAL
PAINLEVEL_OUTOF10: 3
PAINLEVEL_OUTOF10: 6
PAINLEVEL_OUTOF10: 2
PAINLEVEL_OUTOF10: ASSUMED PAIN PRESENT
PAINLEVEL_OUTOF10: 4
PAINLEVEL_OUTOF10: 3
PAINLEVEL_OUTOF10: 3
PAINLEVEL_OUTOF10: 2
PAINLEVEL_OUTOF10: 3
PAINLEVEL_OUTOF10: 4
PAINLEVEL_OUTOF10: 2
PAINLEVEL_OUTOF10: 3
PAINLEVEL_OUTOF10: 3
PAINLEVEL_OUTOF10: 4

## 2018-08-15 ASSESSMENT — COGNITIVE AND FUNCTIONAL STATUS - GENERAL
MOBILITY SCORE: 20
CLIMB 3 TO 5 STEPS WITH RAILING: A LITTLE
DAILY ACTIVITIY SCORE: 20
HELP NEEDED FOR BATHING: A LITTLE
DRESSING REGULAR LOWER BODY CLOTHING: A LITTLE
WALKING IN HOSPITAL ROOM: A LITTLE
MOVING TO AND FROM BED TO CHAIR: A LITTLE
TOILETING: A LITTLE
SUGGESTED CMS G CODE MODIFIER MOBILITY: CJ
PERSONAL GROOMING: A LITTLE
STANDING UP FROM CHAIR USING ARMS: A LITTLE
SUGGESTED CMS G CODE MODIFIER DAILY ACTIVITY: CJ

## 2018-08-15 ASSESSMENT — GAIT ASSESSMENTS
DISTANCE (FEET): 100
DEVIATION: BRADYKINETIC
GAIT LEVEL OF ASSIST: CONTACT GUARD ASSIST

## 2018-08-15 ASSESSMENT — ACTIVITIES OF DAILY LIVING (ADL): TOILETING: INDEPENDENT

## 2018-08-15 NOTE — CARE PLAN
Problem: Safety  Goal: Will remain free from falls    Intervention: Implement fall precautions  Patient educated about calling for help before mobilizing. Verbalized understanding. Bed rails up x2, bed alarm on, bed in low position.       Problem: Knowledge Deficit  Goal: Knowledge of disease process/condition, treatment plan, diagnostic tests, and medications will improve    Intervention: Explain information regarding disease process/condition, treatment plan, diagnostic tests, and medications and document in education  Education reinforced about plan of care, including serial neurological assessments and medications, and admission diagnoses. All questions answered.

## 2018-08-15 NOTE — PROGRESS NOTES
"  Trauma/Surgical Progress Note    Author: Kale Chavez Date & Time created: 8/15/2018 2:45 PM      Interval Events:  Traumatic brain injury  Neurosurgery requests ongoing ICU care with q2 neuro checks  Remains on alcohol withdrawal protocol    Hemodynamics:  Blood pressure 130/89, pulse 80, temperature 37 °C (98.6 °F), resp. rate 16, height 1.803 m (5' 11\"), weight 90.9 kg (200 lb 6.4 oz), SpO2 97 %.     Respiratory:    Respiration: 16, Pulse Oximetry: 97 %        RUL Breath Sounds: Clear, RML Breath Sounds: Clear, RLL Breath Sounds: Diminished, NIKO Breath Sounds: Clear, LLL Breath Sounds: Diminished  Fluids:    Intake/Output Summary (Last 24 hours) at 08/15/18 1446  Last data filed at 08/15/18 1200   Gross per 24 hour   Intake             2956 ml   Output             1400 ml   Net             1556 ml        Admit Weight: 77 kg (169 lb 12.1 oz)  Current Weight: 90.9 kg (200 lb 6.4 oz)    Physical Exam   Constitutional: He is oriented to person, place, and time. He appears well-developed and well-nourished. No distress.   HENT:   Head: Normocephalic and atraumatic.   Eyes: Pupils are equal, round, and reactive to light. EOM are normal.   Cardiovascular: Normal rate and regular rhythm.    Pulmonary/Chest: Breath sounds normal. No respiratory distress.   Abdominal: Soft. He exhibits no distension.   Genitourinary: Penis normal.   Musculoskeletal: Normal range of motion. He exhibits no edema.   Left wrist wrapped   Neurological: He is alert and oriented to person, place, and time.   Skin: Skin is warm and dry.   Psychiatric: He has a normal mood and affect. His behavior is normal.   Nursing note and vitals reviewed.      Medical Decision Making/Problem List:    Active Hospital Problems    Diagnosis   • Intraparenchymal hemorrhage of brain (HCC) [I61.9]     Priority: High     Noted on CT from [facility]  Repeat Ct of head 6 hours from original   Keppra prophylactic X 7 days  ICU observation, Q 2 hour neuro " "checks to continue 8/15  Cognitive evaluation  Non-operative management.  Kaushik Mckeon III, MD. Neurosurgery.     • Fracture of occipital bone (HCC) [S02.119A]     Priority: High     Occipital skull fracture noted on CT films from [facility]  Non-operative management.  Kaushik Mckeon III, MD. Neurosurgery.     • Alcohol intoxication (HCC) [F10.929]     Priority: Medium     Admits to daily and heavy use  BA .278  Alcohol withdrawal protocol with phenobarbital     • Trauma [T14.90XA]     Priority: Low     Trauma Green transfer, fell off 3 foot wall and hit head, unknown LOC, amnestic to event     • Closed fracture of left proximal radius [S52.102A]     Priority: Low     From previous fall \"few days\" prior to presenting for tonight's fall.  Sugar tong splint applied by [facility]  [place] [facility] [place]     • Contraindication to deep vein thrombosis (DVT) prophylaxis [Z53.09]     Priority: Low     Systemic anticoagulation contraindicated secondary to elevated bleeding risk.  Duplex scan ordered for 8/16       Core Measures & Quality Metrics:  Labs reviewed, Medications reviewed and Radiology images reviewed  Livingston catheter: No Livingston      DVT Prophylaxis: Contraindicated - High bleeding risk  DVT prophylaxis - mechanical: SCDs  Ulcer prophylaxis: Yes      Plan:  Neuro checks  Cog eval  Alcohol withdrawal surveillance  Advance diet  Therapies    RAJWINDER Score    Discussed patient condition with RN, RT and Pharmacy.      Critical care time spent exclusive of procedures: 40 minutes.    Kale Chavez MD  [phone]      "

## 2018-08-15 NOTE — THERAPY
"Pt is a 34 y/o male admitted following a fall with subsequent R SDH and ETOH. Pt's L UE splinted (reported injury a couple weeks ago); however, acute imaging showed no acute fracture. Pt is primarily limited by HA and nausea, appears to be sensitive to light as pt preferred to keep eyes closed throughout therapy session. Pt tolerated ambulation x100 but kept eyes open and head down throughout. Pt will benefit from acute PT interventions to address present impairments.     Physical Therapy Evaluation completed.   Bed Mobility:  Supine to Sit: Stand by Assist  Transfers: Sit to Stand: Stand by Assist  Gait: Level Of Assist: Contact Guard Assist with No Equipment Needed       Plan of Care: Will benefit from Physical Therapy 3 times per week  Discharge Recommendations: Equipment: Will Continue to Assess for Equipment Needs. Post-acute therapy:  Anticipate pt will return to baseline PLOF with continued mobility in acute setting. Not likely to require post acute placement; however, PT will continue to assess.         See \"Rehab Therapy-Acute\" Patient Summary Report for complete documentation.     "

## 2018-08-15 NOTE — THERAPY
"Occupational Therapy Evaluation completed.   Functional Status:  CGA supine>sit EOB, CGA w/LB dressing, close SBA sit>stand walking in room and hallway w/close SBA/CGA, had eyes closed and head down post of session. Pt appeared to struggle to verbalize his pain or discomfort. Appeared to have light sensitivity. Stood at sink w/close SBA to brush his teeth, then BTB post session RN aware   Plan of Care: Will benefit from Occupational Therapy 5 times per week  Discharge Recommendations:  Equipment: Will Continue to Assess for Equipment Needs. Post-acute therapy Discharge to home with outpatient or home health for additional skilled therapy services-     See \"Rehab Therapy-Acute\" Patient Summary Report for complete documentation.      33 yr old male admitted for TBI after fall while intoxicated, pt is dx w/an ICH, and fracture of occupital bone. Pt is also wearing a sugar tong splint on his L arm for an assumed L proximal radiads fx, although current x-ray shows no fracture. Pt is demonstrating poor attention, flat affect, decreased balance and activity tolerance impacting ADL's. Pt self reports being now homeless w/limited social supports. Pt will benefit from acute OT, will likely progress in this setting, however is at very high risk for re-current TBI d/t alcohol use, age and life style.   "

## 2018-08-15 NOTE — PROGRESS NOTES
Adams Liriano, Neurosrg PA, at bedside. Pt assessed. Discussed neuro check frequency and C collar status. Neuro checks to remain Q2 and he will call back after Dr. Mckeon reviews images.

## 2018-08-15 NOTE — PROGRESS NOTES
Neurosurgery Progress Note    Subjective:  C/O headache.  No vision change.   No N/V  No upper/lower extremity symptoms.    Exam:  A&O  PERRL  EOM  No drift.  Motor intact.  Cervical spine non tender with full ROM    Pulse  Av.2  Min: 71  Max: 112  Resp  Av.8  Min: 9  Max: 28  Temp  Av.9 °C (98.5 °F)  Min: 36.6 °C (97.8 °F)  Max: 37.3 °C (99.2 °F)  SpO2  Av.1 %  Min: 94 %  Max: 99 %    No Data Recorded    Recent Labs      18   0513  08/15/18   0500   WBC  12.1*  6.5   RBC  5.50  4.56*   HEMOGLOBIN  16.7  14.0   HEMATOCRIT  49.2  40.7*   MCV  89.5  89.3   MCH  30.4  30.7   MCHC  33.9  34.4   RDW  47.3  46.2   PLATELETCT  192  156*   MPV  9.2  9.7     Recent Labs      18   0513  08/15/18   0500   SODIUM  142  134*   POTASSIUM  4.2  3.6   CHLORIDE  106  102   CO2  23  23   GLUCOSE  113*  125*   BUN  7*  7*   CREATININE  0.78  0.75   CALCIUM  8.9  8.6     Recent Labs      18   0513   APTT  25.2   INR  1.05           Intake/Output       18 0700 - 08/15/18 0659 08/15/18 0700 - 18 0659      2110-5196 8860-4953 Total 1900-0659 Total       Intake    P.O.  550  600 1150  --  -- --    P.O.  -- -- --    I.V.  1096  996 2092  --  -- --    IV Volume 8988 764 2856 -- -- --    Total Intake 1646 1596 3242 -- -- --       Output    Urine  650  1000 1650  --  -- --    Number of Times Voided -- 3 x 3 x -- -- --    Urine Void (mL) (non-catheter) 650 1000 1650 -- -- --    Stool  --  -- --  --  -- --    Number of Times Stooled -- 3 x 3 x -- -- --    Total Output 650 1000 1650 -- -- --       Net I/O      -- -- --            Intake/Output Summary (Last 24 hours) at 08/15/18 0845  Last data filed at 08/15/18 0600   Gross per 24 hour   Intake             2976 ml   Output             1650 ml   Net             1326 ml            • Respiratory Care per Protocol   Continuous RT   • Pharmacy Consult Request  1 Each PRN   • docusate sodium  100 mg BID   •  senna-docusate  1 Tab Nightly   • senna-docusate  1 Tab Q24HRS PRN   • polyethylene glycol/lytes  1 Packet BID   • magnesium hydroxide  30 mL DAILY   • bisacodyl  10 mg Q24HRS PRN   • fleet  1 Each Once PRN   • NS   Continuous   • oxyCODONE immediate-release  5 mg Q3HRS PRN   • oxyCODONE immediate release  10 mg Q3HRS PRN   • morphine injection  2-4 mg Q3HRS PRN   • ondansetron  4 mg Q4HRS PRN   • famotidine  20 mg BID    Or   • famotidine  20 mg BID   • acetaminophen  650 mg Q4HRS PRN    Or   • acetaminophen  650 mg Q4HRS PRN   • levETIRAcetam  500 mg BID   • Pharmacy  1 Each PRN    And   • PHENObarbital  130 mg Q30 MIN PRN    And   • PHENObarbital  260 mg Q30 MIN PRN       Assessment and Plan:  Hospital day #2  SAH. No neurosurgical interventions planned  Q 2 hour neuro checks. Risk for DTs  OK to remove collar.

## 2018-08-15 NOTE — CARE PLAN
Problem: Safety  Goal: Will remain free from injury  Outcome: PROGRESSING AS EXPECTED  PT provided assistance with mobility, activity scheduled throughout the day, non slip treaded socks on, bed alarm on, call light within reach, bed in lowest position.     Problem: Pain Management  Goal: Pain level will decrease to patient's comfort goal  Outcome: PROGRESSING AS EXPECTED  Pt encouraged to communicate pain scale, non pharmacologic comfort measured used, low stimulation, tv/music on, lights dimmed, HOB greater than 30 degrees, activity provided as tolerated.

## 2018-08-16 ENCOUNTER — APPOINTMENT (OUTPATIENT)
Dept: RADIOLOGY | Facility: MEDICAL CENTER | Age: 33
DRG: 084 | End: 2018-08-16
Attending: NURSE PRACTITIONER
Payer: COMMERCIAL

## 2018-08-16 PROBLEM — R51.9 HEADACHE: Status: ACTIVE | Noted: 2018-08-16

## 2018-08-16 LAB
ALBUMIN SERPL BCP-MCNC: 3.6 G/DL (ref 3.2–4.9)
ALBUMIN/GLOB SERPL: 1.3 G/DL
ALP SERPL-CCNC: 50 U/L (ref 30–99)
ALT SERPL-CCNC: 13 U/L (ref 2–50)
ANION GAP SERPL CALC-SCNC: 8 MMOL/L (ref 0–11.9)
AST SERPL-CCNC: 15 U/L (ref 12–45)
BASOPHILS # BLD AUTO: 0.5 % (ref 0–1.8)
BASOPHILS # BLD: 0.03 K/UL (ref 0–0.12)
BILIRUB SERPL-MCNC: 0.3 MG/DL (ref 0.1–1.5)
BUN SERPL-MCNC: 5 MG/DL (ref 8–22)
CALCIUM SERPL-MCNC: 8.8 MG/DL (ref 8.5–10.5)
CHLORIDE SERPL-SCNC: 101 MMOL/L (ref 96–112)
CO2 SERPL-SCNC: 25 MMOL/L (ref 20–33)
CREAT SERPL-MCNC: 0.57 MG/DL (ref 0.5–1.4)
EOSINOPHIL # BLD AUTO: 0.05 K/UL (ref 0–0.51)
EOSINOPHIL NFR BLD: 0.8 % (ref 0–6.9)
ERYTHROCYTE [DISTWIDTH] IN BLOOD BY AUTOMATED COUNT: 43.7 FL (ref 35.9–50)
GLOBULIN SER CALC-MCNC: 2.8 G/DL (ref 1.9–3.5)
GLUCOSE SERPL-MCNC: 101 MG/DL (ref 65–99)
HCT VFR BLD AUTO: 40.2 % (ref 42–52)
HGB BLD-MCNC: 14 G/DL (ref 14–18)
IMM GRANULOCYTES # BLD AUTO: 0.02 K/UL (ref 0–0.11)
IMM GRANULOCYTES NFR BLD AUTO: 0.3 % (ref 0–0.9)
LYMPHOCYTES # BLD AUTO: 1.18 K/UL (ref 1–4.8)
LYMPHOCYTES NFR BLD: 19.8 % (ref 22–41)
MAGNESIUM SERPL-MCNC: 1.9 MG/DL (ref 1.5–2.5)
MCH RBC QN AUTO: 30.4 PG (ref 27–33)
MCHC RBC AUTO-ENTMCNC: 34.8 G/DL (ref 33.7–35.3)
MCV RBC AUTO: 87.4 FL (ref 81.4–97.8)
MONOCYTES # BLD AUTO: 0.74 K/UL (ref 0–0.85)
MONOCYTES NFR BLD AUTO: 12.4 % (ref 0–13.4)
NEUTROPHILS # BLD AUTO: 3.93 K/UL (ref 1.82–7.42)
NEUTROPHILS NFR BLD: 66.2 % (ref 44–72)
NRBC # BLD AUTO: 0 K/UL
NRBC BLD-RTO: 0 /100 WBC
PHOSPHATE SERPL-MCNC: 3.4 MG/DL (ref 2.5–4.5)
PLATELET # BLD AUTO: 159 K/UL (ref 164–446)
PMV BLD AUTO: 9.4 FL (ref 9–12.9)
POTASSIUM SERPL-SCNC: 3.7 MMOL/L (ref 3.6–5.5)
PROT SERPL-MCNC: 6.4 G/DL (ref 6–8.2)
RBC # BLD AUTO: 4.6 M/UL (ref 4.7–6.1)
SODIUM SERPL-SCNC: 134 MMOL/L (ref 135–145)
WBC # BLD AUTO: 6 K/UL (ref 4.8–10.8)

## 2018-08-16 PROCEDURE — A9270 NON-COVERED ITEM OR SERVICE: HCPCS | Performed by: NURSE PRACTITIONER

## 2018-08-16 PROCEDURE — 700102 HCHG RX REV CODE 250 W/ 637 OVERRIDE(OP): Performed by: SURGERY

## 2018-08-16 PROCEDURE — G9160 LANG COMP GOAL STATUS: HCPCS | Mod: CI

## 2018-08-16 PROCEDURE — 700112 HCHG RX REV CODE 229: Performed by: NURSE PRACTITIONER

## 2018-08-16 PROCEDURE — 80053 COMPREHEN METABOLIC PANEL: CPT

## 2018-08-16 PROCEDURE — 93971 EXTREMITY STUDY: CPT | Mod: 26 | Performed by: SURGERY

## 2018-08-16 PROCEDURE — 99233 SBSQ HOSP IP/OBS HIGH 50: CPT | Performed by: SURGERY

## 2018-08-16 PROCEDURE — A9270 NON-COVERED ITEM OR SERVICE: HCPCS | Performed by: SURGERY

## 2018-08-16 PROCEDURE — 85025 COMPLETE CBC W/AUTO DIFF WBC: CPT

## 2018-08-16 PROCEDURE — G9159 LANG COMP CURRENT STATUS: HCPCS | Mod: CJ

## 2018-08-16 PROCEDURE — 770001 HCHG ROOM/CARE - MED/SURG/GYN PRIV*

## 2018-08-16 PROCEDURE — G9161 LANG COMP D/C STATUS: HCPCS | Mod: CI

## 2018-08-16 PROCEDURE — 83735 ASSAY OF MAGNESIUM: CPT

## 2018-08-16 PROCEDURE — 700102 HCHG RX REV CODE 250 W/ 637 OVERRIDE(OP): Performed by: NURSE PRACTITIONER

## 2018-08-16 PROCEDURE — 84100 ASSAY OF PHOSPHORUS: CPT

## 2018-08-16 PROCEDURE — 93971 EXTREMITY STUDY: CPT

## 2018-08-16 PROCEDURE — 92523 SPEECH SOUND LANG COMPREHEN: CPT

## 2018-08-16 RX ORDER — FLUDROCORTISONE ACETATE 0.1 MG/1
0.1 TABLET ORAL EVERY MORNING
Status: DISCONTINUED | OUTPATIENT
Start: 2018-08-16 | End: 2018-08-18 | Stop reason: HOSPADM

## 2018-08-16 RX ORDER — BUTALBITAL, ACETAMINOPHEN AND CAFFEINE 50; 325; 40 MG/1; MG/1; MG/1
1 TABLET ORAL EVERY 6 HOURS PRN
Status: DISCONTINUED | OUTPATIENT
Start: 2018-08-16 | End: 2018-08-18 | Stop reason: HOSPADM

## 2018-08-16 RX ADMIN — FAMOTIDINE 20 MG: 20 TABLET ORAL at 05:10

## 2018-08-16 RX ADMIN — SODIUM CHLORIDE TAB 1 GM 2 G: 1 TAB at 11:59

## 2018-08-16 RX ADMIN — OXYCODONE HYDROCHLORIDE 5 MG: 5 TABLET ORAL at 05:10

## 2018-08-16 RX ADMIN — OXYCODONE HYDROCHLORIDE 5 MG: 5 TABLET ORAL at 12:02

## 2018-08-16 RX ADMIN — FLUDROCORTISONE ACETATE 0.1 MG: 0.1 TABLET ORAL at 09:35

## 2018-08-16 RX ADMIN — DOCUSATE SODIUM AND SENNOSIDES 1 TABLET: 8.6; 5 TABLET, FILM COATED ORAL at 21:00

## 2018-08-16 RX ADMIN — POLYETHYLENE GLYCOL 3350 1 PACKET: 17 POWDER, FOR SOLUTION ORAL at 05:09

## 2018-08-16 RX ADMIN — LEVETIRACETAM 500 MG: 500 TABLET ORAL at 17:53

## 2018-08-16 RX ADMIN — POLYETHYLENE GLYCOL 3350 1 PACKET: 17 POWDER, FOR SOLUTION ORAL at 17:54

## 2018-08-16 RX ADMIN — DOCUSATE SODIUM 100 MG: 100 CAPSULE, LIQUID FILLED ORAL at 05:09

## 2018-08-16 RX ADMIN — LEVETIRACETAM 500 MG: 500 TABLET ORAL at 05:10

## 2018-08-16 RX ADMIN — SODIUM CHLORIDE TAB 1 GM 2 G: 1 TAB at 06:52

## 2018-08-16 RX ADMIN — DOCUSATE SODIUM 100 MG: 100 CAPSULE, LIQUID FILLED ORAL at 17:53

## 2018-08-16 RX ADMIN — SODIUM CHLORIDE TAB 1 GM 2 G: 1 TAB at 17:53

## 2018-08-16 ASSESSMENT — ENCOUNTER SYMPTOMS
FEVER: 0
SHORTNESS OF BREATH: 0
MYALGIAS: 0
DOUBLE VISION: 0
NAUSEA: 0
POLYDIPSIA: 0
ABDOMINAL PAIN: 0
HEADACHES: 1
SPEECH CHANGE: 0
NECK PAIN: 0
SENSORY CHANGE: 0

## 2018-08-16 ASSESSMENT — PAIN SCALES - GENERAL
PAINLEVEL_OUTOF10: 2
PAINLEVEL_OUTOF10: 3
PAINLEVEL_OUTOF10: 1
PAINLEVEL_OUTOF10: 3
PAINLEVEL_OUTOF10: 1
PAINLEVEL_OUTOF10: 0
PAINLEVEL_OUTOF10: 0
PAINLEVEL_OUTOF10: 3
PAINLEVEL_OUTOF10: 1
PAINLEVEL_OUTOF10: 3
PAINLEVEL_OUTOF10: 3
PAINLEVEL_OUTOF10: 2
PAINLEVEL_OUTOF10: 1

## 2018-08-16 ASSESSMENT — LIFESTYLE VARIABLES: SUBSTANCE_ABUSE: 1

## 2018-08-16 NOTE — CARE PLAN
Problem: Safety  Goal: Will remain free from injury  Pt assessed for risk to fall at beginning of shift, appropriate interventions in place per flowsheets. Pt given call light and educated on use, calls appropriately. Pt educated on need for bed alarm, bed alarm on.    Problem: Pain Management  Goal: Pain level will decrease to patient's comfort goal  Pt medicated per MAR. Non-pharmacological interventions per flowsheets.

## 2018-08-16 NOTE — PROGRESS NOTES
"  Trauma/Surgical Progress Note    Author: Canelo Lewis Date & Time created: 8/16/2018   12:33 PM     Interval Events:  Medically cleared for transfer to neurosurgery unit  Tertiary survey completed, with no further findings  RAP/SBIRT completed and documented in [facility]    Transfer to neurosurgery   Pt is homeless,  to assist with disposition  Father and brother live around [place].  Pt instructed to reach out to them  ETOH resources and education   Fioricet for headaches.     Review of Systems   Constitutional: Negative for fever.   Eyes: Negative for double vision.   Respiratory: Negative for shortness of breath.    Cardiovascular: Negative for chest pain.   Gastrointestinal: Negative for abdominal pain and nausea.        8/15 (+BM)   Genitourinary:        Voiding     Musculoskeletal: Positive for joint pain. Negative for myalgias and neck pain.        Left upper extremity   Neurological: Positive for headaches. Negative for sensory change and speech change.   Endo/Heme/Allergies: Negative for polydipsia.   Psychiatric/Behavioral: Positive for substance abuse.         contacted     Hemodynamics:  Blood pressure 130/89, pulse 70, temperature 36.6 °C (97.9 °F), resp. rate 14, height 1.803 m (5' 11\"), weight 91.4 kg (201 lb 8 oz), SpO2 97 %.     Respiratory:    Respiration: 14, Pulse Oximetry: 97 %        RUL Breath Sounds: Clear, RML Breath Sounds: Clear, RLL Breath Sounds: Diminished, NIKO Breath Sounds: Clear, LLL Breath Sounds: Diminished  Fluids:    Intake/Output Summary (Last 24 hours) at 08/16/18 1233  Last data filed at 08/16/18 1200   Gross per 24 hour   Intake              780 ml   Output             2100 ml   Net            -1320 ml     Admit Weight: 77 kg (169 lb 12.1 oz)  Current Weight: 91.4 kg (201 lb 8 oz)    Physical Exam   Constitutional: He is oriented to person, place, and time. He appears well-developed and well-nourished. No distress.   HENT:   Head: " "Normocephalic and atraumatic.   Eyes: Pupils are equal, round, and reactive to light.   Neck: Normal range of motion.   Cardiovascular: Normal rate and regular rhythm.    Pulmonary/Chest: Breath sounds normal. No respiratory distress.   Abdominal: Soft. He exhibits no distension.   Musculoskeletal: Normal range of motion. He exhibits no edema.   Left wrist splint   Neurological: He is alert and oriented to person, place, and time.   Skin: Skin is warm and dry.   Psychiatric: He has a normal mood and affect. His behavior is normal.   Nursing note and vitals reviewed.      Medical Decision Making/Problem List:    Active Hospital Problems    Diagnosis   • Intraparenchymal hemorrhage of brain (HCC) [I61.9]     Priority: High     Noted on CT from [facility]  Repeat Ct of head 6 hours from original   Keppra prophylactic X 7 days  ICU observation, Q 2 hour neuro checks to continue 8/15  Cognitive evaluation  Non-operative management.  Kaushik Mckeon III, MD. Neurosurgery.     • Fracture of occipital bone (HCC) [S02.119A]     Priority: High     Occipital skull fracture noted on CT films from [facility]  Non-operative management.  Kaushik Mckeon III, MD. Neurosurgery.     • Alcohol intoxication (HCC) [F10.929]     Priority: Medium     Admits to daily and heavy use  BA .278  Alcohol withdrawal protocol with phenobarbital, resolved.     • Headache [R51]     Priority: Low     8/16 Add Fioricet      • Trauma [T14.90XA]     Priority: Low     Trauma [facility] transfer, fell off 3 foot wall and hit head, unknown LOC, amnestic to event     • Closed fracture of left proximal radius [S52.102A]     Priority: Low     From previous fall \"few days\" prior to presenting for tonight's fall.  Sugar tong splint applied by Hugo Dean  Imaging at [facility] showed no fracture     • Contraindication to deep vein thrombosis (DVT) prophylaxis [Z53.09]     Priority: Low     Systemic anticoagulation contraindicated secondary to elevated bleeding risk.  8/16 " Duplex scan ordered        Core Measures & Quality Metrics:  Labs reviewed, Medications reviewed and Radiology images reviewed  Livingston catheter: No Livingston      DVT Prophylaxis: Contraindicated - High bleeding risk  DVT prophylaxis - mechanical: SCDs  Ulcer prophylaxis: Yes        Total Score: 5  ETOH Screening  CAGE Score: 4  Intervention complete date: 8/16/2018  Patient response to intervention: Drink heavily daily.   Patient demonstrats understanding of intervention.Plan of care:    has been contacted.Follow up with: PCP  Total ETOH intervention time: greater than 30 minutes    Discussed patient condition with RN, Patient and trauma surgery. Dr. Gallegos

## 2018-08-16 NOTE — THERAPY
"Speech Language Therapy Evaluation completed to address cognition  Functional Status:  Pt seen on this date for cognitive evaluation. Pt A&Ox4 and agreeable to evaluation. Portions of the [place] Cognitive Linguistic Assessment and Cognitive Assessment for Right Hemisphere Brain Damage were administered to pt, which found minimal deficits in memory, thought organization, math, and reasoning and moderate deficits in attention. Pt's processing time appeared minimally delayed and he needed directions repeated intermittently through out session, which is suspected due to attention. Appropriately half way through assessment pt reported that he had stopped paying attention due to headache, but with encouragement agreed to continue. Clock drawing and visual scanning tasks were found to be within normal limits. Pt stated that he is currently homeless living on the streets in [place] and is concerned about how he is going to get home following d/c. Pt stated he may have the option of staying with his father in [place] or brother in [place], which is recommended for safe transition home. Pt reported that he does not feel safe d/c back onto the streets right now due to dizziness while walking, but stated he had no cognitive concerns. At this time, would recommend cogntiive speech therapy 2x/wk in the acute care setting and would encourage pt to seek outpatient cognitive speech therapy following d/c to address deficits found during evaluation. Brain injury and role of SLP education provided to pt and all questions answered.    Recommendations:  Cognitive therapy 2x/wk in the acute care setting  Plan of Care: Will benefit from Speech Therapy 2 times per week  Post-Acute Therapy: Discharge to home with outpatient or home health for additional skilled therapy services.    See \"Rehab Therapy-Acute\" Patient Summary Report for complete documentation.               "

## 2018-08-16 NOTE — DISCHARGE PLANNING
Care Transition Team Assessment    If pt is unable to make medical decisions, legal NOK is pt's father   Rony Mock ([phone]).     LSW met with pt at bedside and obtained the information used in this assessment. Pt verified accuracy of facesheet. Pt is currently homeless in [place], [place] and usually sleeps on the street. Pt does not have a pharmacy. Prior to current hospitalization, pt was completely independent in ADLS/IADLS. Pt stated that if he needs to get anywhere, he will walk or try and get a ride. Pt has no income and Pt has financial concerns. Pt has limited support system. LSW asked about pt's father as he has been to bedside daily. Pt stated that him and his father get along but that pt does not get along with his father's wife or her dtr. Pt stated that's why he can not be d/c'ed to his dads house. LSW asked about ride home, pt stated that his dad can probably give him a ride home but unsure about where he will go from there. Pt stated that he does drink alcohol but the amount varies. Pt stated that he will go for some time sober and then pt will begin drinking heavily again, pt stated that he has been like that for awhile. Pt stated that he was supposed to get a mental health evaluation recently but missed the appointment.     Information Source  Orientation : Oriented x 4  Information Given By: Patient  Informant's Name:  (David)  Who is responsible for making decisions for patient? : Patient    Readmission Evaluation  Is this a readmission?: No    Elopement Risk  Legal Hold: No  Ambulatory or Self Mobile in Wheelchair: No-Not an Elopement Risk  Elopement Risk: Not at Risk for Elopement    Interdisciplinary Discharge Planning  Lives with - Patient's Self Care Capacity: Alone and Able to Care For Self  Patient or legal guardian wants to designate a caregiver (see row info): No  Housing / Facility: Homeless  Prior Services: None    Discharge Preparedness  What is your plan after discharge?:  Uncertain - pending medical team collaboration, Home with help, Other (comment)  What are your discharge supports?: Other (comment)  Prior Functional Level: Ambulatory, Independent with Activities of Daily Living, Independent with Medication Management  Difficulity with ADLs: Walking  Difficulity with IADLs: Driving    Functional Assesment  Prior Functional Level: Ambulatory, Independent with Activities of Daily Living, Independent with Medication Management    Finances  Financial Barriers to Discharge: Yes  Average Monthly Income:  (0)  Prescription Coverage: Yes    Vision / Hearing Impairment  Vision Impairment : No  Hearing Impairment : No    Values / Beliefs / Concerns  Values / Beliefs Concerns : No    Advance Directive  Advance Directive?: None    Domestic Abuse  Have you ever been the victim of abuse or violence?: No  Physical Abuse or Sexual Abuse: No  Verbal Abuse or Emotional Abuse: No  Possible Abuse Reported to:: Not Applicable    Psychological Assessment  History of Substance Abuse: Alcohol  Date Last Used - Alcohol: 8/14/18  History of Psychiatric Problems: No  Non-compliant with Treatment: No  Newly Diagnosed Illness: No    Discharge Risks or Barriers  Discharge risks or barriers?: No PCP, Uninsured / underinsured, Transportation, Substance abuse, Post-acute placement / services, Lives alone, no community support, Homeless / couch surfing  Patient risk factors: Homeless, Lack of outside supports, No PCP, Substance abuse, Uninsured or underinsured    Anticipated Discharge Information  Anticipated discharge disposition: Assist with transportation, Discharge needs currently unknown, DME, Home

## 2018-08-16 NOTE — CARE PLAN
Problem: Communication  Goal: The ability to communicate needs accurately and effectively will improve  Outcome: PROGRESSING AS EXPECTED  Pt oriented to plan for today, pt reoriented as needed, plan of care discussed and all questions answered, cognitive consult to meet with pt.     Problem: Safety  Goal: Will remain free from injury  Outcome: PROGRESSING AS EXPECTED  Pt provided scheduled/planned activity throughout the day, non slip treaded socks on, X2 bedrails up, bed alarm on, pt educated to call for assistance when needing to mobilize, mobility assessed, call light within reach of patient.

## 2018-08-17 LAB
ALBUMIN SERPL BCP-MCNC: 3.8 G/DL (ref 3.2–4.9)
ALBUMIN/GLOB SERPL: 1.3 G/DL
ALP SERPL-CCNC: 55 U/L (ref 30–99)
ALT SERPL-CCNC: 14 U/L (ref 2–50)
ANION GAP SERPL CALC-SCNC: 9 MMOL/L (ref 0–11.9)
AST SERPL-CCNC: 14 U/L (ref 12–45)
BASOPHILS # BLD AUTO: 0.9 % (ref 0–1.8)
BASOPHILS # BLD: 0.04 K/UL (ref 0–0.12)
BILIRUB SERPL-MCNC: 0.4 MG/DL (ref 0.1–1.5)
BUN SERPL-MCNC: 6 MG/DL (ref 8–22)
CALCIUM SERPL-MCNC: 9.2 MG/DL (ref 8.5–10.5)
CHLORIDE SERPL-SCNC: 102 MMOL/L (ref 96–112)
CO2 SERPL-SCNC: 25 MMOL/L (ref 20–33)
CREAT SERPL-MCNC: 0.62 MG/DL (ref 0.5–1.4)
EOSINOPHIL # BLD AUTO: 0.04 K/UL (ref 0–0.51)
EOSINOPHIL NFR BLD: 0.9 % (ref 0–6.9)
ERYTHROCYTE [DISTWIDTH] IN BLOOD BY AUTOMATED COUNT: 43.5 FL (ref 35.9–50)
GLOBULIN SER CALC-MCNC: 3 G/DL (ref 1.9–3.5)
GLUCOSE SERPL-MCNC: 108 MG/DL (ref 65–99)
HCT VFR BLD AUTO: 41.6 % (ref 42–52)
HGB BLD-MCNC: 14.3 G/DL (ref 14–18)
LYMPHOCYTES # BLD AUTO: 1.38 K/UL (ref 1–4.8)
LYMPHOCYTES NFR BLD: 30.7 % (ref 22–41)
MANUAL DIFF BLD: ABNORMAL
MCH RBC QN AUTO: 30.2 PG (ref 27–33)
MCHC RBC AUTO-ENTMCNC: 34.4 G/DL (ref 33.7–35.3)
MCV RBC AUTO: 87.8 FL (ref 81.4–97.8)
MONOCYTES # BLD AUTO: 0.36 K/UL (ref 0–0.85)
MONOCYTES NFR BLD AUTO: 7.9 % (ref 0–13.4)
MORPHOLOGY BLD-IMP: NORMAL
NEUTROPHILS # BLD AUTO: 2.68 K/UL (ref 1.82–7.42)
NEUTROPHILS NFR BLD: 47.3 % (ref 44–72)
NEUTS BAND NFR BLD MANUAL: 12.3 % (ref 0–10)
NRBC # BLD AUTO: 0 K/UL
NRBC BLD-RTO: 0 /100 WBC
PLATELET # BLD AUTO: 186 K/UL (ref 164–446)
PLATELET BLD QL SMEAR: NORMAL
PMV BLD AUTO: 9.4 FL (ref 9–12.9)
POTASSIUM SERPL-SCNC: 3.8 MMOL/L (ref 3.6–5.5)
PROT SERPL-MCNC: 6.8 G/DL (ref 6–8.2)
RBC # BLD AUTO: 4.74 M/UL (ref 4.7–6.1)
RBC BLD AUTO: NORMAL
SODIUM SERPL-SCNC: 136 MMOL/L (ref 135–145)
WBC # BLD AUTO: 4.5 K/UL (ref 4.8–10.8)

## 2018-08-17 PROCEDURE — 700102 HCHG RX REV CODE 250 W/ 637 OVERRIDE(OP): Performed by: SURGERY

## 2018-08-17 PROCEDURE — A9270 NON-COVERED ITEM OR SERVICE: HCPCS | Performed by: SURGERY

## 2018-08-17 PROCEDURE — 85027 COMPLETE CBC AUTOMATED: CPT

## 2018-08-17 PROCEDURE — 85007 BL SMEAR W/DIFF WBC COUNT: CPT

## 2018-08-17 PROCEDURE — 770022 HCHG ROOM/CARE - ICU (200)

## 2018-08-17 PROCEDURE — A9270 NON-COVERED ITEM OR SERVICE: HCPCS | Performed by: NURSE PRACTITIONER

## 2018-08-17 PROCEDURE — 80053 COMPREHEN METABOLIC PANEL: CPT

## 2018-08-17 PROCEDURE — 700112 HCHG RX REV CODE 229: Performed by: NURSE PRACTITIONER

## 2018-08-17 PROCEDURE — 99233 SBSQ HOSP IP/OBS HIGH 50: CPT | Performed by: SURGERY

## 2018-08-17 PROCEDURE — 700102 HCHG RX REV CODE 250 W/ 637 OVERRIDE(OP): Performed by: NURSE PRACTITIONER

## 2018-08-17 RX ORDER — OXYCODONE HYDROCHLORIDE 5 MG/1
5 TABLET ORAL EVERY 6 HOURS PRN
Qty: 12 TAB | Refills: 0 | Status: SHIPPED | OUTPATIENT
Start: 2018-08-17 | End: 2018-08-20

## 2018-08-17 RX ORDER — LEVETIRACETAM 500 MG/1
500 TABLET ORAL 2 TIMES DAILY
Qty: 6 TAB | Refills: 0 | Status: SHIPPED | OUTPATIENT
Start: 2018-08-17

## 2018-08-17 RX ORDER — BUTALBITAL, ACETAMINOPHEN AND CAFFEINE 50; 325; 40 MG/1; MG/1; MG/1
1 TABLET ORAL EVERY 6 HOURS PRN
Qty: 20 TAB | Refills: 0 | Status: SHIPPED | OUTPATIENT
Start: 2018-08-17 | End: 2018-08-22

## 2018-08-17 RX ADMIN — SODIUM CHLORIDE TAB 1 GM 2 G: 1 TAB at 06:27

## 2018-08-17 RX ADMIN — DOCUSATE SODIUM AND SENNOSIDES 1 TABLET: 8.6; 5 TABLET, FILM COATED ORAL at 20:30

## 2018-08-17 RX ADMIN — LEVETIRACETAM 500 MG: 500 TABLET ORAL at 17:33

## 2018-08-17 RX ADMIN — POLYETHYLENE GLYCOL 3350 1 PACKET: 17 POWDER, FOR SOLUTION ORAL at 17:33

## 2018-08-17 RX ADMIN — SODIUM CHLORIDE TAB 1 GM 2 G: 1 TAB at 17:33

## 2018-08-17 RX ADMIN — DOCUSATE SODIUM 100 MG: 100 CAPSULE, LIQUID FILLED ORAL at 17:33

## 2018-08-17 RX ADMIN — SODIUM CHLORIDE TAB 1 GM 2 G: 1 TAB at 11:32

## 2018-08-17 RX ADMIN — LEVETIRACETAM 500 MG: 500 TABLET ORAL at 05:13

## 2018-08-17 RX ADMIN — OXYCODONE HYDROCHLORIDE 10 MG: 10 TABLET ORAL at 17:33

## 2018-08-17 RX ADMIN — OXYCODONE HYDROCHLORIDE 5 MG: 5 TABLET ORAL at 20:32

## 2018-08-17 RX ADMIN — FLUDROCORTISONE ACETATE 0.1 MG: 0.1 TABLET ORAL at 05:13

## 2018-08-17 RX ADMIN — OXYCODONE HYDROCHLORIDE 5 MG: 5 TABLET ORAL at 11:37

## 2018-08-17 RX ADMIN — OXYCODONE HYDROCHLORIDE 5 MG: 5 TABLET ORAL at 02:36

## 2018-08-17 RX ADMIN — DOCUSATE SODIUM 100 MG: 100 CAPSULE, LIQUID FILLED ORAL at 05:13

## 2018-08-17 RX ADMIN — POLYETHYLENE GLYCOL 3350 1 PACKET: 17 POWDER, FOR SOLUTION ORAL at 05:13

## 2018-08-17 ASSESSMENT — ENCOUNTER SYMPTOMS
ABDOMINAL PAIN: 0
SHORTNESS OF BREATH: 0
DOUBLE VISION: 0
NAUSEA: 0
HEADACHES: 1
FEVER: 0
MYALGIAS: 0
NECK PAIN: 0
SENSORY CHANGE: 0
SPEECH CHANGE: 0
POLYDIPSIA: 0

## 2018-08-17 ASSESSMENT — PAIN SCALES - GENERAL
PAINLEVEL_OUTOF10: 1
PAINLEVEL_OUTOF10: 2
PAINLEVEL_OUTOF10: 1
PAINLEVEL_OUTOF10: 2
PAINLEVEL_OUTOF10: 4
PAINLEVEL_OUTOF10: 2
PAINLEVEL_OUTOF10: 2
PAINLEVEL_OUTOF10: 4
PAINLEVEL_OUTOF10: 1
PAINLEVEL_OUTOF10: 0
PAINLEVEL_OUTOF10: 1
PAINLEVEL_OUTOF10: 5
PAINLEVEL_OUTOF10: 2
PAINLEVEL_OUTOF10: 3

## 2018-08-17 ASSESSMENT — LIFESTYLE VARIABLES: SUBSTANCE_ABUSE: 1

## 2018-08-17 ASSESSMENT — PATIENT HEALTH QUESTIONNAIRE - PHQ9
SUM OF ALL RESPONSES TO PHQ9 QUESTIONS 1 AND 2: 0
2. FEELING DOWN, DEPRESSED, IRRITABLE, OR HOPELESS: NOT AT ALL
1. LITTLE INTEREST OR PLEASURE IN DOING THINGS: NOT AT ALL

## 2018-08-17 NOTE — DISCHARGE PLANNING
Spoke with evening  Ceciila. Patient will need to cancel his [employer] since he will be staying in [place] in order to receive oxygen.

## 2018-08-17 NOTE — DISCHARGE PLANNING
Received Choice form at 1533  Agency/Facility Name: [facility]  Referral sent per Choice form @ [phone]    MD address is [address]. EVON Thao

## 2018-08-17 NOTE — PROGRESS NOTES
"  Trauma/Surgical Progress Note    Author: Canelo Lewis Date & Time created: 8/17/2018   11:26 AM     Interval Events:  Pt remains cleared for transfer to [facility] or Ortho.     Speech to repeat Cognitive evaluation for disposition am.  Will ready pt for discharge.   Encouraged pt to contact his family for possible disposition plan  Discussed with pt will assist with transportation to shelter if family unavailable.       Review of Systems   Constitutional: Negative for fever.   Eyes: Negative for double vision.   Respiratory: Negative for shortness of breath.    Cardiovascular: Negative for chest pain.   Gastrointestinal: Negative for abdominal pain and nausea.        8/15 (+BM)   Genitourinary:        Voiding     Musculoskeletal: Positive for joint pain. Negative for myalgias and neck pain.        Left upper extremity   Neurological: Positive for headaches. Negative for sensory change and speech change.   Endo/Heme/Allergies: Negative for polydipsia.   Psychiatric/Behavioral: Positive for substance abuse.         contacted     Hemodynamics:  Blood pressure 130/89, pulse (!) 55, temperature 36.7 °C (98.1 °F), resp. rate 14, height 1.803 m (5' 11\"), weight 90.3 kg (199 lb 1.2 oz), SpO2 92 %.     Respiratory:    Respiration: 14, Pulse Oximetry: 92 %        RUL Breath Sounds: Clear, RML Breath Sounds: Clear, RLL Breath Sounds: Clear, NIKO Breath Sounds: Clear, LLL Breath Sounds: Clear  Fluids:    Intake/Output Summary (Last 24 hours) at 08/17/18 1126  Last data filed at 08/17/18 0800   Gross per 24 hour   Intake             1910 ml   Output             3725 ml   Net            -1815 ml     Admit Weight: 77 kg (169 lb 12.1 oz)  Current Weight: 90.3 kg (199 lb 1.2 oz)    Physical Exam   Constitutional: He is oriented to person, place, and time. He appears well-developed and well-nourished. No distress.   HENT:   Head: Normocephalic and atraumatic.   Eyes: Pupils are equal, round, and reactive to light.   Neck: " "Normal range of motion.   Cardiovascular: Normal rate.    Pulmonary/Chest: Breath sounds normal. No respiratory distress.   Abdominal: Soft. He exhibits no distension.   Musculoskeletal: Normal range of motion. He exhibits no edema.   Left wrist splint   Neurological: He is alert and oriented to person, place, and time.   Skin: Skin is warm and dry.   Psychiatric: He has a normal mood and affect. His behavior is normal.   Nursing note and vitals reviewed.      Medical Decision Making/Problem List:    Active Hospital Problems    Diagnosis   • Intraparenchymal hemorrhage of brain (HCC) [I61.9]     Priority: High     Noted on CT from [facility]  Repeat Ct of head 6 hours from original   Keppra prophylactic X 7 days  ICU observation, Q 2 hour neuro checks to continue 8/15  Cognitive evaluation  Non-operative management.  Kaushik Mckeon III, MD. Neurosurgery.     • Fracture of occipital bone (HCC) [S02.119A]     Priority: High     Occipital skull fracture noted on CT films from [facility]  Non-operative management.  Kaushik Mckeon III, MD. Neurosurgery.     • Alcohol intoxication (HCC) [F10.929]     Priority: Medium     Admits to daily and heavy use  BA .278  Alcohol withdrawal protocol with phenobarbital, resolved.     • Headache [R51]     Priority: Low     8/16 Add Fioricet   Improved     • Trauma [T14.90XA]     Priority: Low     Trauma [facility] transfer, fell off 3 foot wall and hit head, unknown LOC, amnestic to event     • Closed fracture of left proximal radius [S52.102A]     Priority: Low     From previous fall \"few days\" prior to presenting for tonight's fall.  Sugar tong splint applied by Hugo Whyteyo  [place] [facility] [place] fracture     • Contraindication to deep vein thrombosis (DVT) prophylaxis [Z53.09]     Priority: Low     Systemic anticoagulation contraindicated secondary to elevated bleeding risk.  8/16 Duplex scan negative for DVT         Core Measures & Quality Metrics:  Labs reviewed, Medications reviewed " and Radiology images reviewed  Livingston catheter: No Livingston      DVT Prophylaxis: Contraindicated - High bleeding risk  DVT prophylaxis - mechanical: SCDs  Ulcer prophylaxis: Yes        Total Score: 5     ETOH Screening  CAGE Score: 4  Intervention complete date: 8/16/2018  Patient response to intervention: Drink heavily daily.   Patient demonstrats understanding of intervention.Plan of care:    has been contacted.Follow up with: PCP  Total ETOH intervention time: greater than 30 minutes    Discussed patient condition with RN, Patient and trauma surgery. Dr. Gallegos

## 2018-08-17 NOTE — FACE TO FACE
Face to Face Note  -  Durable Medical Equipment    RICKY Bridges - NPI: [id number]  I certify that this patient is under my care and that they had a durable medical equipment(DME)face to face encounter by myself that meets the physician DME face-to-face encounter requirements with this patient on:    Date of encounter:   Patient:                    MRN:                       YOB: 2016  David Mock  [id number]  1985     The encounter with the patient was in whole, or in part, for the following medical condition, which is the primary reason for durable medical equipment:  Other - De sats when off O2  85% RA    I certify that, based on my findings, the following durable medical equipment is medically necessary:  Oxygen.    HOME O2 Saturation Measurements:(Values must be present for Home Oxygen orders)      two liters 95%  RA 85%   ,     ,         My Clinical findings support the need for the above equipment due to:  Hypoxia    Supporting Symptoms: safe Discharge and recovery.

## 2018-08-17 NOTE — DISCHARGE PLANNING
Agency[facility]Facility Name: [facility]  Spoke To: Yumiko  Outcome: Declined, they do not accept [employer] insurance

## 2018-08-17 NOTE — PROGRESS NOTES
Patient has episodes of desating to 85% on room air while awake and at rest. Asymptomatic. Recovers to 96% with deep breaths. Encouraged deep breathing and incentive spirometer. MD aware.

## 2018-08-17 NOTE — DISCHARGE PLANNING
Agency/Facility Name: [facility]  Spoke To: Sandoval  Outcome: asked this CCA to call the [facility] office and speak with Ingred at [phone].    Agency[facility]Facility Name: [facility]  Spoke To: Yumiko   Outcome: Will call this CCA back in 10 minutes

## 2018-08-17 NOTE — DISCHARGE SUMMARY
Trauma Discharge Summary    DATE OF ADMISSION: 8/14/2018    DATE OF DISCHARGE: 8/17/2018    LENGTH OF STAY: Five day stay    ATTENDING PHYSICIAN: Miguel Cabrera M.D.    CONSULTING PHYSICIAN:   1. Dr. DIANE Mckeon Neurosurgery      DISCHARGE DIAGNOSIS:  1. Trauma Fell off three foot wall  2. Intraparenchymal hemorrhage of brain  3. Fracture of Occipital bone  4. Closed fracture Left proximal radius  5. ETOH abuse    PROCEDURES:  No procedures during this hospital course.    HISTORY OF PRESENT ILLNESS: The patient is a 33 y.o. male involved in a fall from three foot wall. Mr Mock was subsequently transferred to [facility] for definite trauma care. Pt was triaged as a Trauma Green.    HOSPITAL COURSE: On arrival, Mr Mock was seen by ER physician, Dr. CHICHI Martinez.  Primary and secondary survey was completed, with GCS of 15.  Outside imaging was reviewed with findings of Intraparenchymal hemorrhage, fracture occipital bone and diagnostics demonstrated left Proximal radius fracture, whick was splinted.  Pt was admitted by the trauma team to the SICU  Neurosurgery consult (Dr. DIANE Mckeon) recommended repeat CT head in six hours and initiated Keppra.  Non operative management. Ongoing ICU care for q2hr neuro checks and alcohol withdrawl protocol.   Pt was medically cleared for transfer to the Neurosurgery unit 8/16. Tertiary survey completed, with no further findings. Pt was educated on need to decrease (discontinue) ETOH use and  will give pt outpatient resources.   Plan for discharge 8/18, home with father in [place], [place].    Pt continued to de sat when resting or asleep.  Home O2 ordered.   Father and brother at bedside counseled and ready for pts discharge.     Physical Exam   Constitutional: He is oriented to person, place, and time. He appears well-developed and well-nourished. No distress.   HENT:   Head: Normocephalic and atraumatic.   Eyes: Pupils are equal, round, and reactive to light.    Neck: Normal range of motion.   Cardiovascular: Normal rate.    Pulmonary/Chest: Breath sounds normal. No respiratory distress. Two to three liters for 92% or greater.   Abdominal: Soft. He exhibits no distension.   Musculoskeletal: Normal range of motion. He exhibits no edema.   Left wrist splint   Neurological: He is alert and oriented to person, place, and time.   Skin: Skin is warm and dry.   Psychiatric: He has a normal mood and affect. His behavior is normal.   Nursing note and vitals reviewed    DISCHARGE MEDICATIONS:  I reviewed the patients controlled substance history and obtained a controlled substance use informed consent (if applicable) provided by [facility] and the patient has been prescribed.       Medication List      You have not been prescribed any medications.         DISPOSITION: The patient will be discharged home in stable condition on 8/18. Mr Mock will follow up with PCP in one to two weeks.    The patient and family have been extensively counseled and all questions have been answered. Special attention was paid to respiratory decompensation,  and signs and symptoms of infection and to seek immediate medical attention if these develop. The patient and family demonstrate understanding and give verbal compliance with discharge instructions.    TIME SPENT ON DISCHARGE: 55 minutes      ____________________________________________  RICKY Bridges    DD: 8/17/2018 12:43 PM

## 2018-08-17 NOTE — DISCHARGE PLANNING
Anticipated Discharge Disposition: Home    Action: Pt is to d/c home cecil, RODRIGO met with pt and his father and pt's dad will [facility]. Pt will need home 02. Unsure if [employer] will cover pt's dx for O2. BRADLEYW met with pt at bedside to obtain choice for Home O2. Pt made choice for [facility]. LSW faxed to [facility]. Pt has [employer] insurance but will temporarily be staying with his father. Pt's d/c address is [address] [place] [address].    Barriers to Discharge: DX for O2    Plan: Wait to hear from [facility] on if accepting pt onto their service.

## 2018-08-17 NOTE — CARE PLAN
Problem: Safety  Goal: Will remain free from injury  Outcome: PROGRESSING AS EXPECTED  Encouraged use of call bell before mobilization     Problem: Pain Management  Goal: Pain level will decrease to patient's comfort goal  Outcome: PROGRESSING AS EXPECTED  Per patient pain in posterior head is a 1-5/10 based on positioning. Educated about requesting pain medication before pain becomes severe.

## 2018-08-18 VITALS
RESPIRATION RATE: 22 BRPM | BODY MASS INDEX: 29.29 KG/M2 | SYSTOLIC BLOOD PRESSURE: 142 MMHG | DIASTOLIC BLOOD PRESSURE: 90 MMHG | OXYGEN SATURATION: 94 % | TEMPERATURE: 97.8 F | WEIGHT: 209.22 LBS | HEART RATE: 74 BPM | HEIGHT: 71 IN

## 2018-08-18 PROCEDURE — 700102 HCHG RX REV CODE 250 W/ 637 OVERRIDE(OP): Performed by: NURSE PRACTITIONER

## 2018-08-18 PROCEDURE — 700102 HCHG RX REV CODE 250 W/ 637 OVERRIDE(OP): Performed by: SURGERY

## 2018-08-18 PROCEDURE — 700112 HCHG RX REV CODE 229: Performed by: NURSE PRACTITIONER

## 2018-08-18 PROCEDURE — A9270 NON-COVERED ITEM OR SERVICE: HCPCS | Performed by: NURSE PRACTITIONER

## 2018-08-18 PROCEDURE — 99233 SBSQ HOSP IP/OBS HIGH 50: CPT | Performed by: SURGERY

## 2018-08-18 PROCEDURE — A9270 NON-COVERED ITEM OR SERVICE: HCPCS | Performed by: SURGERY

## 2018-08-18 RX ADMIN — BUTALBITAL, ACETAMINOPHEN, AND CAFFEINE 1 TABLET: 50; 325; 40 TABLET ORAL at 00:29

## 2018-08-18 RX ADMIN — DOCUSATE SODIUM 100 MG: 100 CAPSULE, LIQUID FILLED ORAL at 05:16

## 2018-08-18 RX ADMIN — OXYCODONE HYDROCHLORIDE 5 MG: 5 TABLET ORAL at 07:50

## 2018-08-18 RX ADMIN — SODIUM CHLORIDE TAB 1 GM 2 G: 1 TAB at 11:30

## 2018-08-18 RX ADMIN — MAGNESIUM HYDROXIDE 30 ML: 400 SUSPENSION ORAL at 05:15

## 2018-08-18 RX ADMIN — FLUDROCORTISONE ACETATE 0.1 MG: 0.1 TABLET ORAL at 05:29

## 2018-08-18 RX ADMIN — SODIUM CHLORIDE TAB 1 GM 2 G: 1 TAB at 07:30

## 2018-08-18 RX ADMIN — POLYETHYLENE GLYCOL 3350 1 PACKET: 17 POWDER, FOR SOLUTION ORAL at 05:15

## 2018-08-18 RX ADMIN — BUTALBITAL, ACETAMINOPHEN, AND CAFFEINE 1 TABLET: 50; 325; 40 TABLET ORAL at 10:34

## 2018-08-18 RX ADMIN — LEVETIRACETAM 500 MG: 500 TABLET ORAL at 05:15

## 2018-08-18 ASSESSMENT — ENCOUNTER SYMPTOMS
ABDOMINAL PAIN: 0
SHORTNESS OF BREATH: 0
POLYDIPSIA: 0
NAUSEA: 0
SPEECH CHANGE: 0
FEVER: 0
NECK PAIN: 0
MYALGIAS: 0
SENSORY CHANGE: 0
DOUBLE VISION: 0
HEADACHES: 1

## 2018-08-18 ASSESSMENT — PAIN SCALES - GENERAL
PAINLEVEL_OUTOF10: 4
PAINLEVEL_OUTOF10: 4
PAINLEVEL_OUTOF10: 2
PAINLEVEL_OUTOF10: 4

## 2018-08-18 ASSESSMENT — LIFESTYLE VARIABLES: SUBSTANCE_ABUSE: 1

## 2018-08-18 NOTE — PROGRESS NOTES
Pt wheeled out via wheelchair per [facility] staff. Pt stable condition. Pt's family at bedside to take pt home. Pt and family deny any questions

## 2018-08-18 NOTE — PROGRESS NOTES
Discharge instructions and prescriptions provided to pt. Pt verbalizes understanding of all discharge instructions and denies any further questions. Phoned pt's parents to make aware of discharge.

## 2018-08-18 NOTE — DISCHARGE INSTRUCTIONS
Discharge Instructions    Discharged to home by car with relative. Discharged via wheelchair, hospital escort: Yes.  Special equipment needed: Oxygen    Be sure to schedule a follow-up appointment with your primary care doctor or any specialists as instructed.     Discharge Plan:   Pneumococcal Vaccine Administered/Refused: Not given - Patient refused pneumococcal vaccine  Influenza Vaccine Indication: Patient Refuses    I understand that a diet low in cholesterol, fat, and sodium is recommended for good health. Unless I have been given specific instructions below for another diet, I accept this instruction as my diet prescription.   Other diet: reg    Special Instructions: None    · Is patient discharged on Warfarin / Coumadin?   No     Depression / Suicide Risk    As you are discharged from this [facility] facility, it is important to learn how to keep safe from harming yourself.    Recognize the warning signs:  · Abrupt changes in personality, positive or negative- including increase in energy   · Giving away possessions  · Change in eating patterns- significant weight changes-  positive or negative  · Change in sleeping patterns- unable to sleep or sleeping all the time   · Unwillingness or inability to communicate  · Depression  · Unusual sadness, discouragement and loneliness  · Talk of wanting to die  · Neglect of personal appearance   · Rebelliousness- reckless behavior  · Withdrawal from people/activities they love  · Confusion- inability to concentrate     If you or a loved one observes any of these behaviors or has concerns about self-harm, here's what you can do:  · Talk about it- your feelings and reasons for harming yourself  · Remove any means that you might use to hurt yourself (examples: pills, rope, extension cords, firearm)  · Get professional help from the community (Mental Health, Substance Abuse, psychological counseling)  · Do not be alone:Call your Safe Contact- someone whom you trust who  will be there for you.  · Call your local CRISIS HOTLINE 024-0923 or 938-625-4655  · Call your local Children's Mobile Crisis Response Team [place] (066) 751-4150 or www.Enviance  · Call the toll free National Suicide Prevention Hotlines   · National Suicide Prevention Lifeline 269-936-NTQF (7755)  · National [place] Line Network 800-SUICIDE (903-8648)

## 2018-08-18 NOTE — CARE PLAN
Problem: Communication  Goal: The ability to communicate needs accurately and effectively will improve    Intervention: [place] patient and significant other/support system to call light to alert staff of needs  Plan of care reviewed with patient//verbalized understanding

## 2018-08-18 NOTE — CARE PLAN
Problem: Skin Integrity  Goal: Risk for impaired skin integrity will decrease    Intervention: Assess risk factors for impaired skin integrity and/or pressure ulcers  X 2 RN skin check at the beginning of shift

## 2018-08-18 NOTE — PROGRESS NOTES
"  Trauma/Surgical Progress Note    Author: Canelo Lewis Date & Time created: 8/18/2018   8:47 AM     Interval Events:  Pt ready for discharge.  O2 at bedside  Discharge with family  Follow up with PCP and orthopedics in [facility]    Review of Systems   Constitutional: Negative for fever.   Eyes: Negative for double vision.   Respiratory: Negative for shortness of breath.    Cardiovascular: Negative for chest pain.   Gastrointestinal: Negative for abdominal pain and nausea.        8/15 (+BM)   Genitourinary:        Voiding     Musculoskeletal: Positive for joint pain. Negative for myalgias and neck pain.        Left upper extremity  Negative imaging for fracture   Neurological: Positive for headaches. Negative for sensory change and speech change.   Endo/Heme/Allergies: Negative for polydipsia.   Psychiatric/Behavioral: Positive for substance abuse.         contacted     Hemodynamics:  Blood pressure 142/90, pulse (!) 54, temperature 36.7 °C (98.1 °F), resp. rate (!) 10, height 1.803 m (5' 11\"), weight 94.9 kg (209 lb 3.5 oz), SpO2 97 %.     Respiratory:    Respiration: (!) 10, Pulse Oximetry: 97 %        RUL Breath Sounds: Clear, RML Breath Sounds: Clear, RLL Breath Sounds: Clear, NIKO Breath Sounds: Clear, LLL Breath Sounds: Clear  Fluids:    Intake/Output Summary (Last 24 hours) at 08/18/18 0847  Last data filed at 08/18/18 0800   Gross per 24 hour   Intake              580 ml   Output             2250 ml   Net            -1670 ml     Admit Weight: 77 kg (169 lb 12.1 oz)  Current Weight: 94.9 kg (209 lb 3.5 oz)    Physical Exam   Constitutional: He is oriented to person, place, and time. He appears well-developed and well-nourished. No distress.   HENT:   Head: Normocephalic and atraumatic.   Eyes: Pupils are equal, round, and reactive to light.   Neck: Normal range of motion.   Cardiovascular: Normal rate.    Pulmonary/Chest: Breath sounds normal. No respiratory distress.   Abdominal: Soft. He " "exhibits no distension.   Musculoskeletal: Normal range of motion. He exhibits no edema.   Left wrist splint  Follow up with [facility]   Neurological: He is alert and oriented to person, place, and time.   Skin: Skin is warm and dry.   Psychiatric: He has a normal mood and affect. His behavior is normal.   Nursing note and vitals reviewed.      Medical Decision Making/Problem List:    Active Hospital Problems    Diagnosis   • Intraparenchymal hemorrhage of brain (HCC) [I61.9]     Priority: High     Noted on CT from [facility]  Repeat Ct of head 6 hours from original   Keppra prophylactic X 7 days  ICU observation, Q 2 hour neuro checks to continue 8/15  Cognitive evaluation  Non-operative management.  Kaushik Mckeon III, MD. Neurosurgery.     • Fracture of occipital bone (HCC) [S02.119A]     Priority: High     Occipital skull fracture noted on CT films from [facility]  Non-operative management.  Kaushik Mckeon III, MD. Neurosurgery.     • Alcohol intoxication (HCC) [F10.929]     Priority: Medium     Admits to daily and heavy use  BA .278  Alcohol withdrawal protocol with phenobarbital, resolved.     • Headache [R51]     Priority: Low     8/16 Add Fioricet   Improved     • Trauma [T14.90XA]     Priority: Low     Trauma Green transfer, fell off 3 foot wall and hit head, unknown LOC, amnestic to event     • Closed fracture of left proximal radius [S52.102A]     Priority: Low     From previous fall \"few days\" prior to presenting for tonight's fall.  Sugar tong splint applied by Hugo Dean  Imaging at [facility] showed no fracture.  Follow up with [facility].     • Contraindication to deep vein thrombosis (DVT) prophylaxis [Z53.09]     Priority: Low     Systemic anticoagulation contraindicated secondary to elevated bleeding risk.  8/16 Duplex scan negative for DVT         Core Measures & Quality Metrics:  Labs reviewed, Medications reviewed and Radiology images reviewed  Livingston catheter: No [place]      DVT " Prophylaxis: Contraindicated - High bleeding risk  DVT prophylaxis - mechanical: SCDs  Ulcer prophylaxis: Yes        Total Score: 5     ETOH Screening  CAGE Score: 4  Intervention complete date: 8/16/2018  Patient response to intervention: Drink heavily daily.   Patient demonstrats understanding of intervention.Plan of care:    has been contacted.Follow up with: PCP  Total ETOH intervention time: greater than 30 minutes    Discussed patient condition with RN, Patient and trauma surgery. Dr. Gallegos